# Patient Record
Sex: MALE | Race: WHITE | NOT HISPANIC OR LATINO | Employment: FULL TIME | URBAN - METROPOLITAN AREA
[De-identification: names, ages, dates, MRNs, and addresses within clinical notes are randomized per-mention and may not be internally consistent; named-entity substitution may affect disease eponyms.]

---

## 2017-01-06 ENCOUNTER — ALLSCRIPTS OFFICE VISIT (OUTPATIENT)
Dept: OTHER | Facility: OTHER | Age: 27
End: 2017-01-06

## 2017-01-06 DIAGNOSIS — Z30.2 ENCOUNTER FOR STERILIZATION: ICD-10-CM

## 2017-11-15 ENCOUNTER — TRANSCRIBE ORDERS (OUTPATIENT)
Dept: ADMINISTRATIVE | Facility: HOSPITAL | Age: 27
End: 2017-11-15

## 2017-11-15 ENCOUNTER — ALLSCRIPTS OFFICE VISIT (OUTPATIENT)
Dept: OTHER | Facility: OTHER | Age: 27
End: 2017-11-15

## 2017-11-15 DIAGNOSIS — H57.02: ICD-10-CM

## 2017-11-15 DIAGNOSIS — H53.9 VISUAL DISTURBANCE: ICD-10-CM

## 2017-11-15 DIAGNOSIS — H53.9 VISION DISORDER: Primary | ICD-10-CM

## 2017-11-15 DIAGNOSIS — H57.02 ANISOCORIA: ICD-10-CM

## 2017-11-15 DIAGNOSIS — G44.099 OTHER TRIGEMINAL AUTONOMIC CEPHALGIAS (TAC), NOT INTRACTABLE: ICD-10-CM

## 2017-11-16 NOTE — PROGRESS NOTES
Assessment    1  Change in vision (368 9) (H53 9)  2  Unequal pupils (379 41) (H57 02)    Plan  Change in vision, Unequal pupils    · * MRI BRAIN WO CONTRAST; Status:Need Information - FinancialAuthorization,Retrospective By Protocol Authorization; Requested for:15Nov2017;    · Follow-up PRN Evaluation and Treatment  Follow-up  Status: Complete  Done:15Nov2017 06:34PM    Discussion/Summary    Unequal pupils 6 months ago and now recently in last 2 days  He was noncompliant with seeing f-up neuro ophthalmologist since he was better but now represents with similar complaints  Check MRI brain r/o tumor for change in vision and unequal pupils  [address] [facility]  ER if worse or if any neuro complaints  There was not much change in pupil size comparing left and right eye today  They were both equal for the most part and round and reactive to light and accommodation  The patient was counseled regarding  The treatment plan was reviewed with the patient/guardian  The patient/guardian understands and agrees with the treatment plan      Chief Complaint  pupils are unequal started around two days ago      History of Present Illness  HPI: pupils are unequal started around two days ago  His wife noticed unequal pupils 2 days ago  He had similar complaints 6 months ago and saw his optometrist and was told to get eval  with neuroopthalmologist for f-up but did not since it got better until now  No other complaints and feels well today  He did have visual complaints when it first happened 6 months ago  Pt  denies headaches or neur deficits today  He denies medication or drug use  No sluring of speech or other complaints, no mental status changes  He would like an MRI brain  Review of Systems   Constitutional: no fever or chills, feels well, no tiredness, no recent weight loss or weight gain  ENT: as noted in HPI    Cardiovascular: no complaints of slow or fast heart rate, no chest pain, no palpitations, no leg claudication or lower extremity edema  Respiratory: no complaints of shortness of breath, no wheezing or cough, no dyspnea on exertion, no orthopnea or PND  Gastrointestinal: no complaints of abdominal pain, no constipation, no nausea or vomiting, no diarrhea or bloody stools  Genitourinary: no complaints of dysuria or incontinence, no hesitancy, no nocturia, no genital lesion, no inadequacy of penile erection  Musculoskeletal: no complaints of arthralgia, no myalgia, no joint swelling or stiffness, no limb pain or swelling  Integumentary: no complaints of skin rash or lesion, no itching or dry skin, no skin wounds  Neurological: no complaints of headache, no confusion, no numbness or tingling, no dizziness or fainting-- and-- as noted in HPI  Active Problems  1  Acute URI (465 9) (J06 9)  2  Common cold (460) (J00)  3  Encounter for vasectomy (V25 2) (Z30 2)  4  Pre-hypertension (796 2) (R03 0)  5  Skin rash (782 1) (R21)  6  Sore throat (462) (J02 9)  7  Urticaria (708 9) (L50 9)  8  Vasectomy evaluation (V25 09) (Z30 09)    Past Medical History  1  History of acute sinusitis (V12 69) (Z87 09)  2  History of upper respiratory infection (V12 09) (Z87 09)  3  History of No significant past medical history    Family History  Mother   1  Family history of hypertension (V17 49) (Z82 49)  Father   2  Family history of cardiac disorder (V17 49) (Z82 49)  3  Family history of hypertension (V17 49) (Z82 49)    Social History   · Being A Social Drinker   ·    · Never a smoker   · Never A Smoker   · Never A Smoker   · No alcohol use    Surgical History  1  Denied: History of Recent Surgery  2  History of Surgery Vas Deferens Vasectomy    Allergies  1   No Known Drug Allergies    Vitals   Recorded: [id number] 91:45UJ   Systolic 967   Diastolic 82   Height 6 ft    Weight 195 lb    BMI Calculated 26 45   BSA Calculated 2 11       Physical Exam   Constitutional  General appearance: No acute distress, well appearing and well nourished  Eyes  Conjunctiva and lids: No swelling, erythema, or discharge  Pupils and irises: Equal, round and reactive to light  -- Pupils are equal and round but possible difference in pupil size left islightly larger  EOMI b/l1   Ears, Nose, Mouth, and Throat  External inspection of ears and nose: Normal    Otoscopic examination: Tympanic membrance translucent with normal light reflex  Canals patent without erythema  Nasal mucosa, septum, and turbinates: Normal without edema or erythema  Oropharynx: Normal with no erythema, edema, exudate or lesions  Pulmonary  Respiratory effort: No increased work of breathing or signs of respiratory distress  Auscultation of lungs: Clear to auscultation, equal breath sounds bilaterally, no wheezes, no rales, no rhonci  Cardiovascular  Palpation of heart: Normal PMI, no thrills  Auscultation of heart: Normal rate and rhythm, normal S1 and S2, without murmurs  Examination of extremities for edema and/or varicosities: Normal    Carotid pulses: Normal    Abdomen  Abdomen: Non-tender, no masses  Liver and spleen: No hepatomegaly or splenomegaly  Lymphatic  Palpation of lymph nodes in neck: No lymphadenopathy  Musculoskeletal  Gait and station: Normal    Digits and nails: Normal without clubbing or cyanosis  Inspection/palpation of joints, bones, and muscles: Normal    Skin  Skin and subcutaneous tissue: Normal without rashes or lesions  Neurologic  Cranial nerves: Cranial nerves 2-12 intact  Reflexes: 2+ and symmetric  Sensation: No sensory loss     Psychiatric  Orientation to person, place and time: Normal    Mood and affect: Normal           1 Amended By: Prateek Ramos; Nov 15 2017 6:38 PM EST    Signatures   Electronically signed by : Srinath Hammonds DO; Nov 15 2017  6:38PM EST                       (Author)

## 2017-11-18 ENCOUNTER — HOSPITAL ENCOUNTER (EMERGENCY)
Facility: HOSPITAL | Age: 27
Discharge: HOME | End: 2017-11-18
Attending: EMERGENCY MEDICINE | Admitting: EMERGENCY MEDICINE
Payer: COMMERCIAL

## 2017-11-18 VITALS
DIASTOLIC BLOOD PRESSURE: 83 MMHG | TEMPERATURE: 97.7 F | RESPIRATION RATE: 16 BRPM | HEART RATE: 72 BPM | WEIGHT: 195 LBS | BODY MASS INDEX: 26.41 KG/M2 | SYSTOLIC BLOOD PRESSURE: 146 MMHG | OXYGEN SATURATION: 98 % | HEIGHT: 72 IN

## 2017-11-18 DIAGNOSIS — Z04.9 OBSERVATION AND EVALUATION FOR SUSPECTED CONDITIONS NOT FOUND: Primary | ICD-10-CM

## 2017-11-18 DIAGNOSIS — R03.0 ELEVATED BLOOD PRESSURE READING: ICD-10-CM

## 2017-11-18 PROCEDURE — 99283 EMERGENCY DEPT VISIT LOW MDM: CPT

## 2017-11-18 NOTE — ED PROVIDER NOTES
History  Chief Complaint   Patient presents with    Eye Problem     6 months ago lost peripheral vision at the gym, monday his wife noticed his right pupil was much larger than the left  MRI scheduled for next saturday and opthamology appointment scheduled monday  patients wife says it keeps happening and had him come to the ED  65-year-old male presents for evaluation of anisocoria  Patient states that approximately 6 months ago, he had an episode while he was in the gym where he felt his peripheral vision was diminished and developed a yellow [place] in his bottom right visual field  He returned home and was noted by his wife to have right pupil greater than left  This episode lasted approximately 2 hours before self resolving  He went to the emergency department, but at that point his symptoms were resolved  He was advised to see his optometrist who checked his acuity and occular pressures with normal findings  He was referred to neuro opthalmology, but as he was unable to obtain an appointment for 2 weeks, decided not to pursue further follow up  Patient had a episode where his right pupil was greater than his left again 5 days ago  This time, there were no visual field defects and was otherwise asymptomatic  He has since developed mild pressure behind the eyes bilaterally, right greater than left  He saw his PCP who referred him to ophthalmology and for MRI which is scheduled for next Saturday  His wife noticed yet again that his pupils were unequal this morning  He continues to have mild pressure behind the eyes, but is otherwise asymptomatic  History provided by:  Patient  Eye Problem   Location:  Right eye  Quality: unequal pupils    Severity:  Mild  Onset quality:  Sudden  Duration:  2 hours  Timing:  Constant  Progression:  Resolved  Chronicity:  Recurrent  Context: contact lenses    Relieved by:  None tried  Worsened by:  Nothing  Ineffective treatments:  None tried  Associated symptoms: no discharge, no headaches, no itching, no nausea, no photophobia, no redness, no vomiting and no weakness    Risk factors: no previous injury to eye        None       History reviewed  No pertinent past medical history  History reviewed  No pertinent surgical history  History reviewed  No pertinent family history  I have reviewed and agree with the history as documented  Social History   Substance Use Topics    Smoking status: Never Smoker    Smokeless tobacco: Never Used    Alcohol use No        Review of Systems   Constitutional: Negative for appetite change, diaphoresis, fatigue and fever  HENT: Negative for congestion, rhinorrhea and sore throat  Eyes: Negative for photophobia, pain, discharge, redness, itching and visual disturbance  Respiratory: Negative for cough, chest tightness and shortness of breath  Cardiovascular: Negative for chest pain, palpitations and leg swelling  Gastrointestinal: Negative for abdominal pain, constipation, diarrhea, nausea and vomiting  Genitourinary: Negative for difficulty urinating, dysuria, frequency and hematuria  Musculoskeletal: Negative for myalgias, neck pain and neck stiffness  Skin: Negative for pallor and rash  Neurological: Negative for syncope, weakness and headaches  All other systems reviewed and are negative  Physical Exam  ED Triage Vitals [11/18/17 1205]   Temperature Pulse Respirations Blood Pressure SpO2   97 7 °F (36 5 °C) 70 16 170/97 99 %      Temp Source Heart Rate Source Patient Position - Orthostatic VS BP Location FiO2 (%)   Oral Monitor Sitting Left arm --      Pain Score       No Pain           Orthostatic Vital Signs  Vitals:    11/18/17 1205 11/18/17 1355   BP: 170/97 146/83   Pulse: 70 72   Patient Position - Orthostatic VS: Sitting        Physical Exam   Constitutional: He appears well-developed and well-nourished  Non-toxic appearance  No distress  HENT:   Head: Normocephalic and atraumatic     Eyes: Conjunctivae and EOM are normal  Pupils are equal, round, and reactive to light  Fundoscopic exam:       The right eye shows no AV nicking, no exudate, no hemorrhage and no papilledema  The left eye shows no AV nicking, no exudate, no hemorrhage and no papilledema  Neck: Normal range of motion  No tracheal deviation present  No thyromegaly present  Cardiovascular: Normal rate, regular rhythm, normal heart sounds and intact distal pulses  Pulmonary/Chest: Effort normal and breath sounds normal    Abdominal: Soft  Bowel sounds are normal  He exhibits no distension  There is no tenderness  Lymphadenopathy:     He has no cervical adenopathy  Skin: Skin is warm and dry  He is not diaphoretic  Psychiatric: He has a normal mood and affect  His behavior is normal  Judgment and thought content normal    Nursing note and vitals reviewed  ED Medications  Medications - No data to display    Diagnostic Studies  Results Reviewed     None                 No orders to display         Procedures  Procedures      Phone Consults  ED Phone Contact    ED Course  ED Course                                MDM  Number of Diagnoses or Management Options  Elevated blood pressure reading: new and requires workup  Observation and evaluation for suspected conditions not found: new and requires workup  Diagnosis management comments: 28year old male presents for evaluation of anisocoria  Patient otherwise asymptomatic  Normal fundoscopic exam  20/20 vision bilaterally with contact correction  Patient advised to follow up with his ophthalmologist  Patient also found to have an elevated blood pressure reading during his stay  PCP follow up for re-evaluation      Patient Progress  Patient progress: stable    CritCare Time    Disposition  Final diagnoses:   Elevated blood pressure reading   Observation and evaluation for suspected conditions not found     Time reflects when diagnosis was documented in both MDM as applicable and the Disposition within this note     Time User Action Codes Description Comment    11/18/2017  1:39 PM Samara Patrick Add [R03 0] Elevated blood pressure reading     11/18/2017  1:40 PM Mohan BILLS Add [Z04 9] Observation and evaluation for suspected conditions not found     11/18/2017  1:40 PM Darnell, Samara Wade Modify [R03 0] Elevated blood pressure reading     11/18/2017  1:40 PM Darnell, Samara Wade Modify [Z04 9] Observation and evaluation for suspected conditions not found       ED Disposition     ED Disposition Condition Comment    Discharge  Landon Bassettheather discharge to home/self care  Condition at discharge: Stable        Follow-up Information     Follow up With Specialties Details Why Contact Info Additional Information    Sully Denny, DO Family Medicine  please follow up in regards to your scheduled MRI and for re-evaluation of your elevated blood pressure [address]   [phone]       your ophalmologist  Schedule an appointment as soon as possible for a visit in 1 week As needed for further evaluation      [address] [place] [address] Emergency Department Emergency Medicine Go to if you develop visual changes, numbness, weakness, or severe headache [facility] ED, [address], [facility]        Patient's Medications    No medications on file     No discharge procedures on file  ED Provider  Attending physically available and evaluated Landon Larkin I managed the patient along with the ED Attending      Electronically Signed by         Zayda Collins MD  Resident  11/18/17 2293

## 2017-11-18 NOTE — DISCHARGE INSTRUCTIONS
Hypertension   WHAT YOU NEED TO KNOW:   What is hypertension? Hypertension is high blood pressure (BP)  Your BP is the force of your blood moving against the walls of your arteries  Normal BP is less than 120/80  Prehypertension is between 120/80 and 139/89  Hypertension is 140/90 or higher  Hypertension causes your BP to get so high that your heart has to work much harder than normal  This can damage your heart  You can control hypertension with a healthy lifestyle or medicines  A controlled blood pressure helps protect your organs, such as your heart, lungs, brain, and kidneys  What causes hypertension? The cause of hypertension may not be known  This type of hypertension is called essential or primary hypertension  Hypertension can sometimes be caused by other medical conditions, such as kidney disease  This type of hypertension is called secondary hypertension  What increases my risk for hypertension? · Age older than 54 years (men)    · Age older than 72 years (women)    · A family history of hypertension or heart disease     · Obesity or lack of exercise     · Too many high-sodium foods    · Stress     · Use of tobacco, alcohol, or illegal drugs     · A medical condition, such as diabetes, kidney disease, thyroid disease, or adrenal gland disorder     · Certain medicines, such as steroids or birth control pills  What are the signs and symptoms of hypertension? You may have no signs or symptoms, or you may have any of the following:  · Headache     · Blurred vision     · Chest pain     · Dizziness or weakness     · Trouble breathing    · Nosebleeds  How is hypertension diagnosed? Your healthcare provider will ask about your symptoms and the medicines you take  He or she will also ask if you have a family history of high blood pressure and about any health conditions you have  He or she will also check your blood pressure and weight and examine your heart, lungs, and eyes   You may need any of the following tests:  · Blood tests  may help healthcare providers find the cause of your hypertension  Blood tests can also help find other health problems caused by hypertension  · Urine tests  will be done to check your kidney function  Kidney problems can increase your risk for hypertension  How is hypertension treated? Your healthcare provider will recommend lifestyle changes to lower your BP  You may also need the following medicines:  · Medicine  may be used to help lower your BP  You may need more than one type of medicine  Take the medicine exactly as directed  · Diuretics  help decrease extra fluid that collects in your body  This will help lower your BP  You may urinate more often while you take this medicine  · Cholesterol medicine  helps lower your cholesterol level  A low cholesterol level helps prevent heart disease and makes it easier to control your blood pressure  What can I do to manage hypertension? Talk with your healthcare provider about these and other ways to manage hypertension:  · Check your BP at home  Sit and rest for 5 minutes before you take your BP  Extend your arm and support it on a flat surface  Your arm should be at the same level as your heart  Follow the directions that came with your BP monitor  If possible, take at least 2 BP readings each time  Take your BP at least twice a day at the same times each day, such as morning and evening  Keep a record of your BP readings and bring it to your follow-up visits  Ask your healthcare provider what your BP should be  · Limit sodium (salt) as directed  Too much sodium can affect your fluid balance  Check labels to find low-sodium or no-salt-added foods  Some low-sodium foods use potassium salts for flavor  Too much potassium can also cause health problems  Your healthcare provider will tell you how much sodium and potassium are safe for you to have in a day   He or she may recommend that you limit sodium to 2,300 mg a day            · Follow the meal plan recommended by your healthcare provider  A dietitian or your provider can give you more information on low-sodium plans or the DASH (Dietary Approaches to Stop Hypertension) eating plan  The DASH plan is low in sodium, unhealthy fats, and total fat  It is high in potassium, calcium, and fiber  · Exercise to maintain a healthy weight  Exercise at least 30 minutes per day, on most days of the week  This will help decrease your blood pressure  Ask your healthcare provider about the best exercise plan for you  · Decrease stress  This may help lower your BP  Learn ways to relax, such as deep breathing or listening to music  · Limit alcohol  Women should limit alcohol to 1 drink a day  Men should limit alcohol to 2 drinks a day  A drink of alcohol is 12 ounces of beer, 5 ounces of wine, or 1½ ounces of liquor  · Do not smoke  Nicotine and other chemicals in cigarettes and cigars can increase your BP and also cause lung damage  Ask your healthcare provider for information if you currently smoke and need help to quit  E-cigarettes or smokeless tobacco still contain nicotine  Talk to your healthcare provider before you use these products  · Manage any other health conditions you have  Health conditions such as diabetes can increase your risk for hypertension  Follow your healthcare provider's instructions and take all your medicines as directed  Call 911 for any of the following:   · You have discomfort in your chest that feels like squeezing, pressure, fullness, or pain  · You become confused or have difficulty speaking  · You suddenly feel lightheaded or have trouble breathing  · You have pain or discomfort in your back, neck, jaw, stomach, or arm  When should I seek immediate care? · You have a severe headache or vision loss  · You have weakness in an arm or leg  When should I contact my healthcare provider?    · You feel faint, dizzy, confused, or drowsy  · You have been taking your BP medicine and your BP is still higher than your healthcare provider says it should be  · You have questions or concerns about your condition or care  CARE AGREEMENT:   You have the right to help plan your care  Learn about your health condition and how it may be treated  Discuss treatment options with your caregivers to decide what care you want to receive  You always have the right to refuse treatment  The above information is an  only  It is not intended as medical advice for individual conditions or treatments  Talk to your doctor, nurse or pharmacist before following any medical regimen to see if it is safe and effective for you  © 2017 [address] [facility] Information is for End User's use only and may not be sold, redistributed or otherwise used for commercial purposes  All illustrations and images included in CareNotes® are the copyrighted property of A D A M , Inc  or [employer]ov[employer] Lo[employer]

## 2017-11-19 NOTE — ED ATTENDING ATTESTATION
Darryl Garcia DO, saw and evaluated the patient  I have discussed the patient with the resident/non-physician practitioner and agree with the resident's/non-physician practitioner's findings, Plan of Care, and MDM as documented in the resident's/non-physician practitioner's note, except where noted  All available labs and Radiology studies were reviewed  At this point I agree with the current assessment done in the Emergency Department  I have conducted an independent evaluation of this patient a history and physical is as follows:  Patient is a 79-year-old male presenting for evaluation madi Willingham  Patient states approximately 6 months ago in episode in the gym refilled his peripheral vision was diminished after strenuous activity  He then stated he saw yellow cervical in the bottom right hand corner of his visual field  When he returned home his wife noted that his right pupil is greater than his left  He proceed emergency department that day but by the time is at the emergency department his condition and resolved  She was asymptomatic at that time  He did see his optometrist to checked his visual acuity knocking pressures and he reports that they were normal  He was referred to a neuro ophthalmologist but has not had that appointment as of yet  Symptoms have since resolved  His wife states she again noticed his right pupil is larger than his left  Did follow up with his primary care physician who referred him to Ophthalmology in for an MRI which is scheduled for next week  His wife noticed again this morning that his pupils were unequal and recommended he be evaluated emergency department  Patient is currently without symptoms  Patient is mildly hypertensive in 170s over 90s will repeat that  Currently patient is asymptomatic  We did review typically and score is completely benign condition  His he had normal visual acuity  No other changes in his vision at this time    Repeat blood pressure has improved  We did discuss that he had a conversation with his primary care physician regarding his high blood pressure  Patient understands  Continue outpatient evaluation for his and aniscoria      Critical Care Time  CritCare Time

## 2017-11-25 ENCOUNTER — HOSPITAL ENCOUNTER (OUTPATIENT)
Dept: RADIOLOGY | Facility: HOSPITAL | Age: 27
Discharge: HOME | End: 2017-11-25
Payer: COMMERCIAL

## 2017-11-25 DIAGNOSIS — H57.02 ANISOCORIA: ICD-10-CM

## 2017-11-25 DIAGNOSIS — H53.9 VISUAL DISTURBANCE: ICD-10-CM

## 2017-11-25 PROCEDURE — 70551 MRI BRAIN STEM W/O DYE: CPT

## 2017-11-27 ENCOUNTER — GENERIC CONVERSION - ENCOUNTER (OUTPATIENT)
Dept: OTHER | Facility: OTHER | Age: 27
End: 2017-11-27

## 2017-12-05 ENCOUNTER — TRANSCRIBE ORDERS (OUTPATIENT)
Dept: ADMINISTRATIVE | Facility: HOSPITAL | Age: 27
End: 2017-12-05

## 2017-12-05 ENCOUNTER — ALLSCRIPTS OFFICE VISIT (OUTPATIENT)
Dept: OTHER | Facility: OTHER | Age: 27
End: 2017-12-05

## 2017-12-05 DIAGNOSIS — G93.0 CEREBRAL CYSTS: ICD-10-CM

## 2017-12-05 DIAGNOSIS — H53.9 DISTURBANCES OF VISION, LATE EFFECT OF CEREBROVASCULAR DISEASE: Primary | ICD-10-CM

## 2017-12-05 DIAGNOSIS — I69.998 DISTURBANCES OF VISION, LATE EFFECT OF CEREBROVASCULAR DISEASE: Primary | ICD-10-CM

## 2017-12-05 DIAGNOSIS — H57.02 ANISOCORIA: ICD-10-CM

## 2017-12-05 DIAGNOSIS — G44.099 TACS (TRIGEMINAL AUTONOMIC CEPHALGIAS): ICD-10-CM

## 2017-12-06 NOTE — CONSULTS
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)    Plan  Change in vision    · (1) BUN; Status:Active; Requested for:16Bgj4110;    Perform:[facility]; Due:14Qxo5253; Ordered; For:Change in vision; Ordered By:Trisha Cain;   · (1) CREATININE; Status:Active; Requested for:58Bel4580;    Perform:[facility]; Due:95Joy9033; Ordered; For:Change in vision; Ordered By:Trisha Cain;  Change in vision, Episodic anisocoria, Subarachnoid cyst    · EEG AWAKE (ROUTINE); Status:Hold For - Scheduling; Requested for:98Zpj3905;    Perform:[facility]; RKT:85KSO6158; Ordered;in vision, Episodic anisocoria, Subarachnoid cyst; Ordered By:Trisha Cain;  Change in vision, Episodic anisocoria, Trigeminal autonomic cephalgias    · Follow-up visit in 3 months Evaluation and Treatment  Follow-up  Status: Hold For -Scheduling  Requested for: 34DSS8043   Ordered; Change in vision, Episodic anisocoria, Trigeminal autonomic cephalgias; Ordered By: Trisha Helms Performed:  Due: 53HXF6327  Episodic anisocoria, Trigeminal autonomic cephalgias, Unequal pupils    · CTA HEAD AND NECK W WO CONTRAST; Status:Need Information - FinancialAuthorization; Requested for:16Nxl8010;    Perform:[facility]; [phone]; Ordered;anisocoria, Trigeminal autonomic cephalgias, Unequal pupils; Ordered By:Trisha Cain;  Trigeminal autonomic cephalgias    · Indomethacin 25 MG Oral Capsule; TAKE 1 - 2 CAPSULEs 3 TIMES DAILY WITHFOOD AS NEEDED  PRN: onset of headache  Max 3 days/week   Rx By: Jeremiah Barba; Dispense: 30 Days ; #:15 Capsule; Refill: 1;Trigeminal autonomic cephalgias; DAVONTE = N; Faxed To: [facility]Y #[id number]  · Verapamil HCl - 40 MG Oral Tablet;  Take 1 tablet daily at night x 1 week then 1 tabletBID   Rx By: Jeremiah Barba; Dispense: 0 Days ; #:60 Tablet; Refill: 5;Trigeminal autonomic cephalgias; DAVONTE = N; Faxed To: [facility]/PHARMACY #[id number]   Discussion/Summary  Discussion Summary:   New onset trigeminal autonomic cephalgia most likely cause of episodic pupil dilation often associated with mild to moderate new onset headaches and increased lacrimation of the left eye (often dilated)  Neurologic exam non focal at this timeAbortive: IndocinPreventative: Verapamil- 40 daily, increase to 40 BID as tolerated- discussed s/e including constipation, hypotension  Another option is propranolol should he have trouble tolerating this medication- he does have pre-HTN and these medications would help with this as well  He does have a picture of him during one of these episodes- which shows clear marked pupil dilation on the leftMRI bran with no significant abnormalities- 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 Discussed notifying his PCP and following up with them in this regard, especially given family history  Follow up in three months time  Counseling Documentation With Imm: The patient was counseled regarding  Medication SE Review and Pt Understands Tx: Possible side effects of new medications were reviewed with the patient/guardian today  The treatment plan was reviewed with the patient/guardian  The patient/guardian understands and agrees with the treatment plan   Patient Guardian understands agrees:  The treatment plan was reviewed with the patient/guardian  The patient/guardian understands and agrees with the treatment plan   Headache [place]:  The patient was counseled regarding;   Discussed side effects of all medications prescribed today to the patient in detail  Patient education was completed today and we also discussed precautions for rebound headaches  --   When patient has a moderate to severe headache, they should seek rest, initiate relaxation and apply cold compresses to the head  Also recommended to the patient :  1  Maintain regular sleep schedule -- 2  Limit over the counter medications  (No more than 3 times a week)  -- 3  Maintain headache diary  -- 4  Limit caffeine to 1-2 cups a day or less  -- 6  Patient is to have regular frequent meals to prevent headache onset  Chief Complaint  Chief Complaint Free Text Note Form: Patient presents for a consultation for visual changes  History of Present Illness  HPI: Mr Ion Negro is a pleasant 27 yo male seen in consultation for vision changes starting six months ago  the first time he noted this was about six months ago when he was working out, initially had starry vision and went home, he started noting increasingly growing [place] in his visual field that persisted with him closing either eye  States his peripheral vision kept getting narrower  States lasted 2-3 hours  States no headache with this  States his wife noted his right pupil was larger than left pupil he almost went to the ER, and states this resolved by the time he got there thus did not  he saw opthalmology and no abnormalities per them  he had another event 3-4 weeks ago, where he got home and his wife noted transient right pupil dilation- states this was reactive per his wife  mild to moderate headache with this  increased tearing of his right eye in association  since 3-4 weeks ago has had twice weekly headaches, associated with headache and tearing- lasting 2-3 days   States he can work through these headaches- take Advil and goes about his day  headaches new- left sided frontal- dull achy, no photo or phonophobia, nausea or vomiting  No triggers  No change with position  States usually notes it in afternoon  Not worse or triggered by activity, cough, BM, sneezing, chewing or lying down for a prolonged period  No other associated neurologic deficits other than that noted above  States other than his first episode, has not had actual vision change- his wife just notes pupil abnormality and he notes increased tearing and headaches  Advil 400 or 600 mg taken 2-3 days a week can help at times  paternal grandfather and maternal grandmother with stroke- states does not know if they had an aneurysmfamily history of MS in brother has not gone back to exercise since this episode  HTN noted when he checks it occasionally states upto 140s SBP  no other neurologic deficits  States he feels great othrwise  stays hydrated, does not skip meals, no significant caffeine use, and has a healthy diet  no sleep deprivation  family history headaches, migraines, recurrent blood clots  Dad with CAD and HTN in 45s  Review of Systems  Neurological ROS:  Constitutional: no fever, no chills, no recent weight gain, no recent weight loss, no complaints of feeling tired, no changes in appetite  HEENT:  no sinus problems, not feeling congested, no blurred vision, no dryness of the eyes, no eye pain, no hearing loss, no tinnitus, no mouth sores, no sore throat, no hoarseness, no dysphagia, no masses, no bleeding  Respiratory:  no unusual or persistant cough, no shortness of breath with or without exertion  Gastrointestinal:  no nausea, no vomiting, no diarrhea, no abdominal pain, no changes in bowel habits, no melena, no loss of bowel control  Genitourinary:  no incontinence, no feelings of urinary urgency, no increase in frequency, no urinary hesitancy, no dysuria, no hematuria    Musculoskeletal:  no arthralgias, no myalgias, no immobility or loss of function, no head/neck/back pain, no pain while walking  Integumentary  no masses, no rash, no skin lesions, no livedo reticularis  Psychiatric:  no anxiety, no depression, no mood swings, no psychiatric hospitalizations, no sleep problems  Endocrine  no unusual weight loss or gain, no excessive urination, no excessive thirst, no hair loss or gain, no hot or cold intolerance, no menstrual period change or irregularity, no loss of sexual ability or drive, no erection difficulty, no nipple discharge  Hematologic/Lymphatic:  no unusual bleeding, no tendency for easy bruising, no clotting skin or lumps  Neurological General: headache  Neurological Cranial Nerves:  no blurry or double vision, no loss of vision, no face drooping, no facial numbness or weakness, no taste or smell loss/changes, no hearing loss or ringing, no vertigo or dizziness, no dysphagia, no slurred speech  Neurological Motor findings include:  no tremor, no twitching, no cramping(pre/post exercise), no atrophy  Neurological Coordination:  no unsteadiness, no vertigo or dizziness, no clumsiness, no problems reaching for objects  Neurological Sensory:  no numbness, no pain, no tingling, does not fall when eyes closed or taking a shower  Neurological Gait:  no difficulty walking, not falling to one side, no sensation of being pushed, has not had falls  ROS Reviewed:   ROS reviewed  Active Problems  1  Acute URI (465 9) (J06 9)   2  Change in vision (368 9) (H53 9)   3  Common cold (460) (J00)   4  Encounter for vasectomy (V25 2) (Z30 2)   5  Pre-hypertension (796 2) (R03 0)   6  Skin rash (782 1) (R21)   7  Sore throat (462) (J02 9)   8  Unequal pupils (379 41) (H57 02)   9  Urticaria (708 9) (L50 9)   10  Vasectomy evaluation (V25 09) (Z30 09)    Past Medical History  1  History of acute sinusitis (V12 69) (Z87 09)   2  History of upper respiratory infection (V12 09) (Z87 09)   3   History of No significant past medical history  Active Problems And Past Medical History Reviewed: The active problems and past medical history were reviewed and updated today  Surgical History  1  Denied: History of Recent Surgery   2  History of Surgery Vas Deferens Vasectomy    Family History  Mother    1  Family history of hypertension (V17 49) (Z82 49)  Father    2  Family history of cardiac disorder (V17 49) (Z82 49)   3  Family history of hypertension (V17 49) (Z82 49)  Family History Reviewed: The family history was reviewed and updated today  Social History   · Being A Social Drinker   ·    · Never a smoker   · Never A Smoker   · Never A Smoker   · No alcohol use  Social History Reviewed: The social history was reviewed and updated today  Allergies    1  No Known Drug Allergies    Vitals  Signs   Recorded: 13KJO7729 01:09PM   Heart Rate: 72  Respiration: 14  Systolic: 745  Diastolic: 80  Height: 6 ft   Weight: 195 lb   BMI Calculated: 26 45  BSA Calculated: 2 11  O2 Saturation: 96    Physical Exam   Constitutional  General Appearance: Appears appropriate for age, healthy, well developed, appropriately groomed and appropriately dressed   Eyes  Ophthalmoscopic examination: Vision is grossly normal  Gross visual field testing by confrontation shows no abnormalities  EOMI in both eyes  Conjunctivae clear  Eyelids normal palpebral fissures equal  Orbits exhibit normal position  No discharge from the eyes  PERRL  External inspection of ears and nose: Normal      Neck  Neck and thyroid: Normal to inspection and palpation  Pulmonary  Respiratory effort: Lungs are clear bilaterally  Cardiovascular  Auscultation of heart: Rate is regular  Rhythm is regular  Peripheral vascular exam: Normal pulses throughout, no tenderness, erythema or swelling  Musculoskeletal  Gait and Station: Walks with normal gait  Tandem walk test is normal  Romberg's test is negative  Muscle strength: Normal strength throughout     Muscle tone: No atrophy, abnormal movements, flaccidity, cogwheeling or spasticity  Range of motion: Normal     Stability: Normal     Inspection of temporomandibular joint appears normal    Neurologic  Orientation to person, place, and time: Normal    Attention span and concentration: Normal thought process and attention span  Language: Names objects, able to repeat phrases and speaks spontaneously  Fund of knowledge: Normal vocabulary with appropriate knowledge of current events and past history  Sensation: Intact sensation to pinprick, temperature, vibration, and proprioception in all four extremities  Reflexes: DTR's are normal and symmetric bilaterally  Babinski's reflex is negative bilaterally  No pathologic ankle clonus  Coordination: Cerebellum function intact  No involuntary movement or psychomotor activity  Motor System: No pronator drift  Upper Extremities: Normal to inspection  No tenderness over the upper extremities bilaterally  No instability bilaterally  Strength: Motor strength is 5/5 bilaterally  Normal muscle tone bilaterally  Muscle bulk: Muscle bulk is normal bilaterally  Full ROM bilaterally  Lower Extremities: Normal to inspection and palpation  No tenderness of the lower extremities bilaterally  Exhibits no instability bilaterally  Strength: Motor strength is 5/5 bilaterally  Normal muscle tone bilaterally  Muscle Bulk: Muscle bulk is normal bilaterally  Full ROM bilaterally  Cranial Nerve Exam  II: Normal with no deficit  III,IV, VI: Normal with no deficit  V: Normal with no deficit  VII: Normal with no deficit  VIII: Normal with no deficit  IX: Normal with no deficit  X: Normal with no deficit  XI: Normal with no deficit  XII: Normal with no deficit  Recent and remote memory: Intact      Mood and affect: Normal        Signatures   Electronically signed by : Gladis Mancuso MD; Dec  5 2017  2:13PM EST                       (Author)

## 2017-12-13 ENCOUNTER — APPOINTMENT (OUTPATIENT)
Dept: LAB | Facility: HOSPITAL | Age: 27
End: 2017-12-13
Attending: PSYCHIATRY & NEUROLOGY
Payer: COMMERCIAL

## 2017-12-13 DIAGNOSIS — H53.9 VISUAL DISTURBANCE: ICD-10-CM

## 2017-12-13 LAB
BUN SERPL-MCNC: 15 MG/DL (ref 5–25)
CREAT SERPL-MCNC: 0.93 MG/DL (ref 0.6–1.3)
GFR SERPL CREATININE-BSD FRML MDRD: 111 ML/MIN/1.73SQ M

## 2017-12-13 PROCEDURE — 84520 ASSAY OF UREA NITROGEN: CPT

## 2017-12-13 PROCEDURE — 82565 ASSAY OF CREATININE: CPT

## 2017-12-13 PROCEDURE — 36415 COLL VENOUS BLD VENIPUNCTURE: CPT

## 2017-12-20 ENCOUNTER — HOSPITAL ENCOUNTER (OUTPATIENT)
Dept: NEUROLOGY | Facility: HOSPITAL | Age: 27
Discharge: HOME | End: 2017-12-23
Attending: PSYCHIATRY & NEUROLOGY
Payer: COMMERCIAL

## 2017-12-20 ENCOUNTER — GENERIC CONVERSION - ENCOUNTER (OUTPATIENT)
Dept: OTHER | Facility: OTHER | Age: 27
End: 2017-12-20

## 2017-12-20 DIAGNOSIS — I69.998 DISTURBANCES OF VISION, LATE EFFECT OF CEREBROVASCULAR DISEASE: ICD-10-CM

## 2017-12-20 DIAGNOSIS — G93.0 CEREBRAL CYSTS: ICD-10-CM

## 2017-12-20 DIAGNOSIS — H53.9 DISTURBANCES OF VISION, LATE EFFECT OF CEREBROVASCULAR DISEASE: ICD-10-CM

## 2017-12-20 DIAGNOSIS — H57.02 ANISOCORIA: ICD-10-CM

## 2017-12-20 PROCEDURE — 95816 EEG AWAKE AND DROWSY: CPT

## 2017-12-20 NOTE — PROCEDURES
ELECTROENCEPHALOGRAM    Patient Name:  Gal Martínez  MRN: [id number]   :  1990 File #: Pastora Ma [id number]   Age: 28 y o  Encounter #: [id number]   Date performed: 2017 Referring Provider: Prince Pfeiffer MD          Report date: 2017          Study type: awake and drowsy EEG    ICD 10 diagnosis: Spells/Fit NOS R56 9 and Visual disturbance, unspecified H53 9    Patient History:  EEG is requested to assess for seizures and/or classification of epilepsy  Patient is 28 y o  male who has been having recurrent headaches associated with visual disturbances  Sometimes there is loss of peripheral vision, sometimes there is a large yellow (colorful) [place]  He has been having headaches over the left frontal region 1-2 times a week  Current AEDs: none  Medications include: verapamil, fish oil    Description of Procedure: The EEG was performed with electrodes applied using the International 10-20 System  Additional electrodes used included EOG, ECG and T1/T2 electrodes  A single lead ECG channel is also present  At least 16 channels are reviewed at a time and formatted into longitudinal bipolar, transverse bipolar, and referential (to common reference or calculated common reference) montages  The EEG was recorded with the patient awake and drowsy  The recording was technically satisfactory  EEG was recorded from 17:12 to 17:46  Findings:   Background Activity: The background is grossly symmetric with respect to voltages and activities  During wakefulness, the background is well-organized with anterior low amplitude beta activity and low-moderate amplitude posterior alpha activity  There is a symmetric 10-10 5 Hz posterior dominant rhythm that attenuates with eye opening        Drowsiness is characterized by attentuation of the alpha rhythm, prominent anterior beta activity, central theta activity, roving eye movements, vertex waves and positive occipital sharp transients of sleep (POSTS)  Activation Procedures:   Hyperventilation was performed with good effort up to 4 minutes and produced symmetric diffuse background slowing and recurrent high amplitude rhythmic 2 Hz delta activity  There was no unusual behavior  Patient reported feeling dizzy as if he was going to pass out  High amplitude rhythmic delta activity stopped when the patient stopped hyperventilating  Mild background slowing did not last longer than 1 minute after cessation of hyperventilation  Stepped photic stimulation from 1 to 30 fps was performed and produced symmetric photic driving response  Other findings: The single lead ECG shows a regular, sinus rhythm and sinus bradycardia  Interpretation: This is a normal 34 minutes awake and drowsy EEG  Hyperventilation induced high amplitude rhythmic delta activity is unusual for the patient's age group but is not considered a pathological finding  There is no focal slowing from hyperventilation  Given the association of hyperventilation induced vasoconstriction of the intracranial vessels, it is reasonable to consider evaluation of the patency of the intracranial vessels      Elda Tellez MD  [facility]  [address] [place] [address]

## 2017-12-26 ENCOUNTER — ALLSCRIPTS OFFICE VISIT (OUTPATIENT)
Dept: OTHER | Facility: OTHER | Age: 27
End: 2017-12-26

## 2017-12-27 NOTE — PROGRESS NOTES
Assessment   1  Acute URI (465 9) (J06 9)   2  Sore throat (462) (J02 9)    Plan   Acute URI, Sore throat    · Azithromycin 250 MG Oral Tablet; TAKE 2 TABLETS ON DAY 1 THEN TAKE 1    TABLET A DAY FOR 4 DAYS   · Follow-up PRN Evaluation and Treatment  Follow-up  Status: Complete  Done:    [id number]    Discussion/Summary      Rest and fluids, start abx and call if any problems  The patient was counseled regarding  Possible side effects of new medications were reviewed with the patient/guardian today  The treatment plan was reviewed with the patient/guardian  The patient/guardian understands and agrees with the treatment plan      Chief Complaint   Pt c/o sore throat x 2 days, chills, post nasal drip x 1 day  ksd,cma      History of Present Illness   HPI: Pt c/o sore throat x 2 days, chills, post nasal drip x 1 day  Here with daughter  Review of Systems        Constitutional: no fever or chills, feels well, no tiredness, no recent weight loss or weight gain  ENT: as noted in HPI  Cardiovascular: no complaints of slow or fast heart rate, no chest pain, no palpitations, no leg claudication or lower extremity edema  Respiratory: no complaints of shortness of breath, no wheezing or cough, no dyspnea on exertion, no orthopnea or PND  Gastrointestinal: no complaints of abdominal pain, no constipation, no nausea or vomiting, no diarrhea or bloody stools  Genitourinary: no complaints of dysuria or incontinence, no hesitancy, no nocturia, no genital lesion, no inadequacy of penile erection  Musculoskeletal: no complaints of arthralgia, no myalgia, no joint swelling or stiffness, no limb pain or swelling  Integumentary: no complaints of skin rash or lesion, no itching or dry skin, no skin wounds  Neurological: no complaints of headache, no confusion, no numbness or tingling, no dizziness or fainting  Active Problems   1  Acute URI (465 9) (J06 9)   2   Change in vision (368  9) (H53 9)   3  Common cold (460) (J00)   4  Encounter for vasectomy (V25 2) (Z30 2)   5  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)   6  Pre-hypertension (796 2) (R03 0)   7  Skin rash (782 1) (R21)   8  Sore throat (462) (J02 9)   9  Subarachnoid cyst (348 0) (G93 0)   10  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)   11  Unequal pupils (379 41) (H57 02)   12  Urticaria (708 9) (L50 9)   13  Vasectomy evaluation (V25 09) (Z30 09)    Past Medical History   1  History of acute sinusitis (V12 69) (Z87 09)   2  History of upper respiratory infection (V12 09) (Z87 09)   3  History of No significant past medical history    Family History   Mother    1  Family history of hypertension (V17 49) (Z82 49)  Father    2  Family history of cardiac disorder (V17 49) (Z82 49)   3  Family history of hypertension (V17 49) (Z82 49)    Social History    · Being A Social Drinker   ·    · Never a smoker   · Never A Smoker   · Never A Smoker   · No alcohol use    Surgical History   1  Denied: History of Recent Surgery   2  History of Surgery Vas Deferens Vasectomy    Current Meds    1  Indomethacin 25 MG Oral Capsule; TAKE 1 - 2 CAPSULEs 3 TIMES DAILY WITH FOOD     AS NEEDED  PRN: onset of headache  Max 3 days/week; Therapy: [id number] to (Evaluate:56Eix2143); Last Rx:[id number] Ordered   2  Verapamil HCl - 40 MG Oral Tablet; Take 1 tablet daily at night x 1 week then 1 tablet     BID; Therapy: 20SCQ4012 to (Last Rx:61Doq7562) Ordered    Allergies   1  No Known Drug Allergies    Vitals    Recorded: 63JCT9075 12:55PM   Temperature 56 4 F   Systolic 836   Diastolic 70   Height 6 ft    Weight 193 lb 2 oz   BMI Calculated 26 19   BSA Calculated 2 1     Physical Exam        Constitutional      General appearance: No acute distress, well appearing and well nourished  Eyes      Conjunctiva and lids: No swelling, erythema, or discharge  Pupils and irises: Equal, round and reactive to light         Ears, Nose, Mouth, and Throat      External inspection of ears and nose: Normal        Otoscopic examination: Tympanic membrance translucent with normal light reflex  Canals patent without erythema  Nasal mucosa, septum, and turbinates: Normal without edema or erythema  Oropharynx: Abnormal  -- red throat  Pulmonary      Respiratory effort: No increased work of breathing or signs of respiratory distress  Auscultation of lungs: Clear to auscultation, equal breath sounds bilaterally, no wheezes, no rales, no rhonci  Cardiovascular      Palpation of heart: Normal PMI, no thrills  Auscultation of heart: Normal rate and rhythm, normal S1 and S2, without murmurs  Examination of extremities for edema and/or varicosities: Normal        Carotid pulses: Normal        Abdomen      Abdomen: Non-tender, no masses  Liver and spleen: No hepatomegaly or splenomegaly  Lymphatic      Palpation of lymph nodes in neck: No lymphadenopathy  Musculoskeletal      Gait and station: Normal        Digits and nails: Normal without clubbing or cyanosis  Inspection/palpation of joints, bones, and muscles: Normal        Skin      Skin and subcutaneous tissue: Normal without rashes or lesions  Neurologic      Cranial nerves: Cranial nerves 2-12 intact  Reflexes: 2+ and symmetric  Sensation: No sensory loss  Psychiatric      Orientation to person, place and time: Normal        Mood and affect: Normal           Results/Data   PHQ-2 Adult Depression Screening 11Tys7400 12:57PM User, [facility]      Test Name Result Flag Reference   PHQ-2 Adult Depression Score 0     Over the last two weeks, how often have you been bothered by any of the following problems?      Little interest or pleasure in doing things: Not at all - 0     Feeling down, depressed, or hopeless: Not at all - 0   PHQ-2 Adult Depression Screening Negative          Future Appointments      Date/Time Provider Specialty Site   02/07/2018 03:00 PM Yoselyn Benjamin MD Neurology  [id number]     Signatures    Electronically signed by : Will Tyson DO; Dec 26 2017  1:27PM EST                       (Author)

## 2017-12-30 ENCOUNTER — HOSPITAL ENCOUNTER (OUTPATIENT)
Dept: CT IMAGING | Facility: HOSPITAL | Age: 27
Discharge: HOME | End: 2017-12-30
Attending: PSYCHIATRY & NEUROLOGY
Payer: COMMERCIAL

## 2017-12-30 DIAGNOSIS — H57.02 ANISOCORIA: ICD-10-CM

## 2017-12-30 DIAGNOSIS — G44.099 OTHER TRIGEMINAL AUTONOMIC CEPHALGIAS (TAC), NOT INTRACTABLE: ICD-10-CM

## 2017-12-30 PROCEDURE — 70496 CT ANGIOGRAPHY HEAD: CPT

## 2017-12-30 PROCEDURE — 70498 CT ANGIOGRAPHY NECK: CPT

## 2017-12-30 RX ADMIN — IOHEXOL 85 ML: 350 INJECTION, SOLUTION INTRAVENOUS at 08:06

## 2018-01-12 VITALS
SYSTOLIC BLOOD PRESSURE: 140 MMHG | BODY MASS INDEX: 27.41 KG/M2 | DIASTOLIC BLOOD PRESSURE: 100 MMHG | HEIGHT: 72 IN | WEIGHT: 202.38 LBS | HEART RATE: 60 BPM

## 2018-01-12 NOTE — RESULT NOTES
Verified Results  * MRI BRAIN WO CONTRAST [id number] 12:58PM Yesica Kaur Order Number: [id number]    - Patient Instructions: To schedule this appointment, please contact Central Scheduling at [phone]  Test Name Result Flag Reference   MRI BRAIN WO CONTRAST (Report)     MRI BRAIN WITHOUT CONTRAST     INDICATION: Unequal pupils  Visual disturbance for 6 months  COMPARISON:  None  TECHNIQUE: Sagittal T1, axial T2, axial FLAIR, axial T1, axial Creighton and axial diffusion imaging  IMAGE QUALITY: Diagnostic  FINDINGS:     BRAIN PARENCHYMA: Small arachnoid cyst peripheral to the left cerebellar hemisphere incidentally noted  Slight mass effect upon the hemisphere  Normal cerebral hemispheres  Unremarkable basal ganglia and basilar cisterns  Normal signal within the brainstem and cerebellum  Diffusion imaging is unremarkable  No acute or chronic hemorrhage  Normal corpus callosum and hypothalamus  VENTRICLES: The ventricles are normal in size and contour  SELLA AND PITUITARY GLAND: Normal      ORBITS: Normal      PARANASAL SINUSES: Normal      VASCULATURE: Evaluation of the major intracranial vasculature demonstrates appropriate flow voids  CALVARIUM AND SKULL BASE: Normal      EXTRACRANIAL SOFT TISSUES: Normal        IMPRESSION:     No acute intracranial pathology  Incidentally noted small arachnoid cyst peripheral to the left cerebellar hemisphere         Workstation performed: [id number]     Signed by:   Morteza Dunn DO   11/27/17

## 2018-01-13 VITALS
SYSTOLIC BLOOD PRESSURE: 122 MMHG | WEIGHT: 195 LBS | HEIGHT: 72 IN | BODY MASS INDEX: 26.41 KG/M2 | DIASTOLIC BLOOD PRESSURE: 82 MMHG

## 2018-01-23 VITALS
BODY MASS INDEX: 26.41 KG/M2 | RESPIRATION RATE: 14 BRPM | HEIGHT: 72 IN | DIASTOLIC BLOOD PRESSURE: 80 MMHG | HEART RATE: 72 BPM | OXYGEN SATURATION: 96 % | SYSTOLIC BLOOD PRESSURE: 124 MMHG | WEIGHT: 195 LBS

## 2018-01-23 VITALS
BODY MASS INDEX: 26.16 KG/M2 | WEIGHT: 193.13 LBS | HEIGHT: 72 IN | SYSTOLIC BLOOD PRESSURE: 124 MMHG | TEMPERATURE: 98.1 F | DIASTOLIC BLOOD PRESSURE: 70 MMHG

## 2018-01-23 NOTE — PROCEDURES
Procedures by Johan Diaz MD at  2017  6:05 PM      Author:  Johan Diaz MD Service:  Neurology Author Type:  Physician    Filed:  2017  6:24 PM Date of Service:  2017  6:05 PM Status:  Signed    :  Johan Diaz MD (Physician)        Procedure Orders:       1  EEG Routine and awake [[id number]] ordered by  at 17 1408                  ELECTROENCEPHALOGRAM    Patient Name:  Wilmar Gold  MRN: [id number]   :  1990 File #: Quynh Gamboa    Age: 28 y o  Encounter #: [id number]   Date performed: 2017 Referring Provider: Eusebia Patricio MD          Report date: 2017          Study type: awake and drowsy EEG    ICD 10 diagnosis: Spells/Fit NOS R56 9 and Visual disturbance, unspecified H53 9    Patient History:  EEG is requested to assess for seizures and/or classification of epilepsy  Patient is 28 y o  male who has been having recurrent headaches associated with visual disturbances  Sometimes there is loss of peripheral vision, sometimes there is a large yellow (colorful) [place]  He has been having headaches over the left frontal region 1-2 times a week  Current AEDs: none  Medications include: verapamil, fish oil    Description of Procedure: The EEG was performed with electrodes applied using the International 10-20 System  Additional electrodes used included EOG, ECG and T1/T2 electrodes  A single lead ECG channel is also  present  At least 16 channels are reviewed at a time  and formatted into longitudinal bipolar, transverse bipolar, and referential (to common reference or calculated common reference) montages  The EEG was recorded  with the patient awake and drowsy  The recording was technically satisfactory  EEG was recorded from 17:12 to 17:46  Findings:   Background Activity: The background is grossly symmetric with respect to voltages and activities    During wakefulness, the background is well-organized with anterior low amplitude beta activity and low-moderate amplitude  posterior alpha activity  There is a symmetric 10-10 5 Hz posterior dominant rhythm that attenuates with eye opening  Drowsiness is characterized by attentuation of the alpha rhythm, prominent anterior beta activity, central theta activity, roving eye movements, vertex waves and positive occipital sharp transients of sleep  (POSTS)  Activation Procedures:   Hyperventilation was performed with good effort up to 4 minutes and produced symmetric diffuse  background slowing and recurrent high amplitude rhythmic 2 Hz delta activity  There was no unusual behavior  Patient reported feeling dizzy as if he was going to pass out  High amplitude rhythmic delta activity stopped when the patient stopped hyperventilating  Mild background slowing did not last longer than 1 minute after cessation of hyperventilation  Stepped photic stimulation from 1 to 30 fps was performed and produced symmetric photic driving response  Other findings: The single lead ECG shows a regular, sinus rhythm and sinus bradycardia  Interpretation: This is a normal 34 minutes awake and drowsy  EEG  Hyperventilation induced high amplitude rhythmic delta activity is unusual for the patient's age group but is not considered a pathological finding  There is no focal slowing from hyperventilation  Given the association of hyperventilation induced vasoconstriction of the intracranial vessels, it is reasonable to consider evaluation of the patency of the intracranial vessels  MD [facility]  Nghia PRAJAPATI    Dec 20 2017  6:24PM [facility] Standard Time

## 2018-03-14 ENCOUNTER — ANESTHESIA EVENT (OUTPATIENT)
Dept: PERIOP | Facility: HOSPITAL | Age: 28
End: 2018-03-14
Payer: COMMERCIAL

## 2018-03-15 RX ORDER — VERAPAMIL HYDROCHLORIDE 40 MG/1
40 TABLET ORAL 2 TIMES DAILY
COMMUNITY
End: 2018-07-16 | Stop reason: DRUGHIGH

## 2018-03-15 NOTE — PRE-PROCEDURE INSTRUCTIONS
Pre-Surgery Instructions:   Medication Instructions    verapamil (CALAN) 40 mg tablet Instructed patient per Anesthesia Guidelines      Pre op and showering instructions reviewed with his wife-Pt has hibiclens

## 2018-03-22 ENCOUNTER — HOSPITAL ENCOUNTER (OUTPATIENT)
Facility: HOSPITAL | Age: 28
Setting detail: OUTPATIENT SURGERY
Discharge: HOME | End: 2018-03-22
Attending: SURGERY | Admitting: SURGERY
Payer: COMMERCIAL

## 2018-03-22 ENCOUNTER — ANESTHESIA (OUTPATIENT)
Dept: PERIOP | Facility: HOSPITAL | Age: 28
End: 2018-03-22
Payer: COMMERCIAL

## 2018-03-22 VITALS
BODY MASS INDEX: 24.38 KG/M2 | HEART RATE: 67 BPM | SYSTOLIC BLOOD PRESSURE: 138 MMHG | TEMPERATURE: 96.8 F | DIASTOLIC BLOOD PRESSURE: 78 MMHG | RESPIRATION RATE: 18 BRPM | WEIGHT: 180 LBS | OXYGEN SATURATION: 100 % | HEIGHT: 72 IN

## 2018-03-22 PROCEDURE — C1781 MESH (IMPLANTABLE): HCPCS | Performed by: SURGERY

## 2018-03-22 DEVICE — MODIFIED ONFLEX MESH
Type: IMPLANTABLE DEVICE | Site: INGUINAL | Status: FUNCTIONAL
Brand: MODIFIED ONFLEX MESH

## 2018-03-22 RX ORDER — MIDAZOLAM HYDROCHLORIDE 1 MG/ML
INJECTION INTRAMUSCULAR; INTRAVENOUS AS NEEDED
Status: DISCONTINUED | OUTPATIENT
Start: 2018-03-22 | End: 2018-03-22 | Stop reason: SURG

## 2018-03-22 RX ORDER — METOCLOPRAMIDE HYDROCHLORIDE 5 MG/ML
10 INJECTION INTRAMUSCULAR; INTRAVENOUS ONCE AS NEEDED
Status: DISCONTINUED | OUTPATIENT
Start: 2018-03-22 | End: 2018-03-22 | Stop reason: HOSPADM

## 2018-03-22 RX ORDER — FENTANYL CITRATE/PF 50 MCG/ML
50 SYRINGE (ML) INJECTION
Status: DISCONTINUED | OUTPATIENT
Start: 2018-03-22 | End: 2018-03-22 | Stop reason: HOSPADM

## 2018-03-22 RX ORDER — FENTANYL CITRATE 50 UG/ML
INJECTION, SOLUTION INTRAMUSCULAR; INTRAVENOUS AS NEEDED
Status: DISCONTINUED | OUTPATIENT
Start: 2018-03-22 | End: 2018-03-22 | Stop reason: SURG

## 2018-03-22 RX ORDER — ONDANSETRON 2 MG/ML
4 INJECTION INTRAMUSCULAR; INTRAVENOUS EVERY 4 HOURS PRN
Status: DISCONTINUED | OUTPATIENT
Start: 2018-03-22 | End: 2018-03-22 | Stop reason: HOSPADM

## 2018-03-22 RX ORDER — ONDANSETRON 2 MG/ML
INJECTION INTRAMUSCULAR; INTRAVENOUS AS NEEDED
Status: DISCONTINUED | OUTPATIENT
Start: 2018-03-22 | End: 2018-03-22 | Stop reason: SURG

## 2018-03-22 RX ORDER — SODIUM CHLORIDE 9 MG/ML
INJECTION, SOLUTION INTRAVENOUS CONTINUOUS PRN
Status: DISCONTINUED | OUTPATIENT
Start: 2018-03-22 | End: 2018-03-22 | Stop reason: SURG

## 2018-03-22 RX ORDER — MAGNESIUM HYDROXIDE 1200 MG/15ML
LIQUID ORAL AS NEEDED
Status: DISCONTINUED | OUTPATIENT
Start: 2018-03-22 | End: 2018-03-22 | Stop reason: HOSPADM

## 2018-03-22 RX ORDER — ONDANSETRON 2 MG/ML
4 INJECTION INTRAMUSCULAR; INTRAVENOUS ONCE AS NEEDED
Status: DISCONTINUED | OUTPATIENT
Start: 2018-03-22 | End: 2018-03-22 | Stop reason: HOSPADM

## 2018-03-22 RX ORDER — OXYCODONE HYDROCHLORIDE AND ACETAMINOPHEN 5; 325 MG/1; MG/1
1 TABLET ORAL EVERY 4 HOURS PRN
Status: DISCONTINUED | OUTPATIENT
Start: 2018-03-22 | End: 2018-03-22 | Stop reason: HOSPADM

## 2018-03-22 RX ORDER — BUPIVACAINE HYDROCHLORIDE 2.5 MG/ML
INJECTION, SOLUTION INFILTRATION; PERINEURAL AS NEEDED
Status: DISCONTINUED | OUTPATIENT
Start: 2018-03-22 | End: 2018-03-22 | Stop reason: HOSPADM

## 2018-03-22 RX ORDER — PROPOFOL 10 MG/ML
INJECTION, EMULSION INTRAVENOUS AS NEEDED
Status: DISCONTINUED | OUTPATIENT
Start: 2018-03-22 | End: 2018-03-22 | Stop reason: SURG

## 2018-03-22 RX ORDER — PROPOFOL 10 MG/ML
INJECTION, EMULSION INTRAVENOUS CONTINUOUS PRN
Status: DISCONTINUED | OUTPATIENT
Start: 2018-03-22 | End: 2018-03-22 | Stop reason: SURG

## 2018-03-22 RX ADMIN — MIDAZOLAM HYDROCHLORIDE 2 MG: 1 INJECTION, SOLUTION INTRAMUSCULAR; INTRAVENOUS at 09:25

## 2018-03-22 RX ADMIN — FENTANYL CITRATE 25 MCG: 50 INJECTION INTRAMUSCULAR; INTRAVENOUS at 10:22

## 2018-03-22 RX ADMIN — FENTANYL CITRATE 25 MCG: 50 INJECTION INTRAMUSCULAR; INTRAVENOUS at 09:30

## 2018-03-22 RX ADMIN — PROPOFOL 200 MG: 10 INJECTION, EMULSION INTRAVENOUS at 09:30

## 2018-03-22 RX ADMIN — FENTANYL CITRATE 25 MCG: 50 INJECTION INTRAMUSCULAR; INTRAVENOUS at 09:31

## 2018-03-22 RX ADMIN — DEXAMETHASONE SODIUM PHOSPHATE 4 MG: 10 INJECTION INTRAMUSCULAR; INTRAVENOUS at 09:34

## 2018-03-22 RX ADMIN — ONDANSETRON 4 MG: 2 INJECTION INTRAMUSCULAR; INTRAVENOUS at 09:34

## 2018-03-22 RX ADMIN — LIDOCAINE HYDROCHLORIDE 100 MG: 20 INJECTION, SOLUTION INTRAVENOUS at 09:30

## 2018-03-22 RX ADMIN — SODIUM CHLORIDE: 0.9 INJECTION, SOLUTION INTRAVENOUS at 09:27

## 2018-03-22 RX ADMIN — FENTANYL CITRATE 25 MCG: 50 INJECTION INTRAMUSCULAR; INTRAVENOUS at 10:08

## 2018-03-22 RX ADMIN — CEFAZOLIN SODIUM 2000 MG: 1 SOLUTION INTRAVENOUS at 09:27

## 2018-03-22 RX ADMIN — OXYCODONE HYDROCHLORIDE AND ACETAMINOPHEN 1 TABLET: 5; 325 TABLET ORAL at 11:21

## 2018-03-22 RX ADMIN — PROPOFOL 120 MCG/KG/MIN: 10 INJECTION, EMULSION INTRAVENOUS at 09:33

## 2018-03-22 NOTE — DISCHARGE INSTRUCTIONS
DIET AND ACTIVITY AS TOLERATED  MAY SHOWER  PAIN MEDICATION AS NEEDED  PLEASE USE MILK A MAGNESIA DAILY IN ORDER TO HELP PREVENT CONSTIPATION  PLEASE CALL [phone] FOR A FOLLOW-UP OFFICE VISIT FOR 2 WEEKS  MAY DRIVE WHEN NOT TAKING PAIN MEDICATION

## 2018-03-22 NOTE — ANESTHESIA PREPROCEDURE EVALUATION
Review of Systems/Medical History  Patient summary reviewed  Chart reviewed  No history of anesthetic complications     Cardiovascular  Negative cardio ROS    Pulmonary  Negative pulmonary ROS        GI/Hepatic            Endo/Other     GYN       Hematology   Musculoskeletal       Neurology    Headaches (Ocular side effects: on verapamil),    Psychology           Physical Exam    Airway    Mallampati score: II  TM Distance: >3 FB  Neck ROM: full     Dental       Cardiovascular  Comment: Negative ROS,     Pulmonary      Other Findings        Anesthesia Plan  ASA Score- 2     Anesthesia Type- general with ASA Monitors  Additional Monitors:   Airway Plan: LMA  Plan Factors-    Induction- intravenous  Postoperative Plan-     Informed Consent- Anesthetic plan and risks discussed with patient  I personally reviewed this patient with the CRNA  Discussed and agreed on the Anesthesia Plan with the CRNA  Maggy Hyde

## 2018-03-22 NOTE — OP NOTE
OPERATIVE REPORT  PATIENT NAME: Bakari Hawthorne    :  1990  MRN: [id number]  Pt Location: [facility]    SURGERY DATE: 3/22/2018    Surgeon(s) and Role:     Francisco Javier Collins MD - Primary    Preop Diagnosis:  Inguinal hernia [K40 90]    Post-Op Diagnosis Codes:     * Inguinal hernia [K40 90]    Procedure(s) (LRB):  INGUINAL HERNIA REPAIR (Right)    Specimen(s):  * No specimens in log *    Estimated Blood Loss:   Minimal    Drains:       Anesthesia Type:   General    Operative Indications:  Inguinal hernia [K40 90]      Operative Findings:  INDEPENDENT, NONSMOKER, ASA 2, WOUND CLASS 1, BMI 24, WEIGHT 180, HEIGHT 72 INCHES    Cardiovascular  Negative cardio ROS  Pulmonary  Negative pulmonary ROS       GI/Hepatic          Endo/Other    GYN      Hematology Musculoskeletal      Neurology  Headaches (Ocular side effects: on verapamil),  Psychology         Complications:   None    Procedure and Technique:  PATIENT WAS IDENTIFIED VISUALLY AND VIA ARMBAND  PLACED IN SUPINE POSITION  AFTER GENERAL ANESTHESIA THE ABDOMEN WAS PREPPED AND DRAPED IN A STERILE FASHION  0 25% MARCAINE WITH EPINEPHRINE WAS UTILIZED  INCISION WAS MADE THE RIGHT INGUINAL REGION  THIS CARRIED DOWN TO SKIN SUBCUTANEOUS TISSUE  SUPERFICIAL EPIGASTRIC VEIN WAS CLAMPED DIVIDED AND LIGATED  EXTERNAL OBLIQUE FIBERS WERE DIVIDED  A SELF-RETAINING RETRACTOR WAS THEN PLACED  CORD STRUCTURES WERE ENCIRCLED IN A PENROSE DRAIN AND PRESERVED  IF THERE IS NO TRANSVERSALIS FLOOR  THE DIRECT INGUINAL HERNIA SITE WAS OPENED  A PREPERITONEAL PLANE WAS THEN CREATED  A 3 INCH X 5 INCH PREPERITONEAL MESH WAS THEN INSERTED  THIS WAS SEWN IN PLACE WITH INTERRUPTED FIGURE-OF-EIGHT 1  VICRYL SUTURE  THIS CONNECTED THE TRANSVERSALIS FLOOR TO THE SHELVING EDGE OF THE INGUINAL LIGAMENT  ONLAY MESH WAS THEN ALSO SECURED  THE AREA WAS IRRIGATED  HIS ASPIRATED DRY  HEMOSTASIS WAS ASSURED    THE WOUND WAS THEN CLOSED WITH A RUNNING 3 0 PDS FOR EXTERNAL OBLIQUE FOLLOWED BY 3 0 VICRYL SUBCUTANEOUS AND 4 MONOCRYL SUTURES  DERMABOND WAS APPLIED  THE PATIENT TOLERATED THE PROCEDURE WELL  SPONGE AND COUNT CORRECT     I was present for the entire procedure    Patient Disposition:  PACU     SIGNATURE: Sera Prescott MD  DATE: March 22, 2018  TIME: 10:39 AM

## 2018-03-22 NOTE — ANESTHESIA POSTPROCEDURE EVALUATION
Post-Op Assessment Note      CV Status:  Stable    Mental Status:  Alert and awake    Hydration Status:  Euvolemic    PONV Controlled:  Controlled    Airway Patency:  Patent    Post Op Vitals Reviewed: Yes          Staff: CRNA, Anesthesiologist           BP   115/57   Temp  97 8   Pulse  53   Resp 12   SpO2   100

## 2018-05-03 ENCOUNTER — APPOINTMENT (OUTPATIENT)
Dept: RADIOLOGY | Facility: CLINIC | Age: 28
End: 2018-05-03
Payer: COMMERCIAL

## 2018-05-03 ENCOUNTER — OFFICE VISIT (OUTPATIENT)
Dept: OBGYN CLINIC | Facility: MEDICAL CENTER | Age: 28
End: 2018-05-03
Payer: COMMERCIAL

## 2018-05-03 VITALS
DIASTOLIC BLOOD PRESSURE: 79 MMHG | SYSTOLIC BLOOD PRESSURE: 133 MMHG | BODY MASS INDEX: 25.27 KG/M2 | HEART RATE: 97 BPM | HEIGHT: 72 IN | WEIGHT: 186.6 LBS

## 2018-05-03 DIAGNOSIS — M25.551 PAIN IN RIGHT HIP: ICD-10-CM

## 2018-05-03 DIAGNOSIS — M54.5 LOW BACK PAIN, UNSPECIFIED BACK PAIN LATERALITY, UNSPECIFIED CHRONICITY, WITH SCIATICA PRESENCE UNSPECIFIED: ICD-10-CM

## 2018-05-03 DIAGNOSIS — M25.552 PAIN IN LEFT HIP: ICD-10-CM

## 2018-05-03 DIAGNOSIS — G57.01 PIRIFORMIS SYNDROME OF RIGHT SIDE: Primary | ICD-10-CM

## 2018-05-03 PROCEDURE — 73522 X-RAY EXAM HIPS BI 3-4 VIEWS: CPT

## 2018-05-03 PROCEDURE — 99204 OFFICE O/P NEW MOD 45 MIN: CPT | Performed by: FAMILY MEDICINE

## 2018-05-03 PROCEDURE — 72100 X-RAY EXAM L-S SPINE 2/3 VWS: CPT

## 2018-05-03 NOTE — PROGRESS NOTES
1  Piriformis syndrome of right side  [facility]   2  Pain in left hip  XR hip/pelv 2-3 vws left if performed   3  Pain in right hip  CANCELED: XR hip/pelv 2-3 vws right if performed   4  Low back pain, unspecified back pain laterality, unspecified chronicity, with sciatica presence unspecified  XR spine lumbar 2 or 3 views injury     Physical Therapy     Orders Placed This Encounter   Procedures    XR hip/pelv 2-3 vws left if performed    XR spine lumbar 2 or 3 views injury    [facility]        Imaging Studies (I personally reviewed results in PACS):  X-ray AP pelvis bilateral hips 05/03/2018:  No acute osseous abnormality  X-ray lumbar spine 05/03/2018:  No acute osseous abnormality mild osteoarthritis L5-S1    IMPRESSION:    Piriformis syndrome right side  Greater trochanteric process syndrome      Return in about 6 weeks (around 6/14/2018)  Patient Instructions   Educated risks of mixing NSAIDS (also known as non-steroidal anti-inflammatory pills) including advil, ibuprofen, motrin, aleve, naproxen, aspirin, and ibuprofen containing products with each other or with steroids (such as prednisone, medrol)  Explained risks of mixing these medications including stomach ulcer, severe internal bleeding, and kidney failure  Instructed not to take NSAIDS if have history of stomach ulcers, kidney issues, or hypertension  Instructed patient to use only one brand as prescribed  For naproxen, a maximum of 500 mg per dose every 12 hours and no more than two doses or 1,000mg per day  For Ibuprofen, a maximum of 800 mg per dose every 6 hours but no more than 3 doses or 2,400 mg per day  Never take these medications together  Never take these medications the same day  For severe pain and only if you have no liver problems, you may add Tylenol (also known as acetaminophen) maximum of 1,000  Mg per dose every 6 hours but no more 3 doses or 3,000 mg per day   Patient expressed understanding and agreed to plan               CHIEF COMPLAINT:  Right hip pain    HPI:  Herbie Aguilar is a 28 y o  male  who presents for  Evaluation of right hip pain is an ongoing for approximately 1-2 years  Patient denies any injury or specific event related to his pain  Patient's past MA significant for right inguinal hernia repair 2018 without any change in his symptoms  He states he never any pain associated with this hernia however he had resolution of cosmetically bulge with surgery approximately 1 month ago  He states that his suture sites fully healed without any issue  Visit 05/03/2018:   Patient points to right lateral aspect of his hip as source of pain  Describes the pain as dull achy intermittent worse with jumping to lie on his side at nighttime  He points to trochanteric bursa region as well as his posterior hip rotators as source of pain  He denies any groin pain  Denies any numbness and tingling of his lower extremities  Denies any fevers chills saddle anesthesia bowel or bladder incontinence  Review of Systems   Constitutional: Negative for chills and fever  HENT: Negative for hearing loss  Eyes: Negative for visual disturbance  Respiratory: Negative for shortness of breath  Cardiovascular: Negative for chest pain  Gastrointestinal: Negative for abdominal pain  Genitourinary: Negative for difficulty urinating and dysuria  Neurological: Negative for weakness and numbness  Psychiatric/Behavioral: Negative for suicidal ideas           Following history reviewed and update:    Past Medical History:   Diagnosis Date    Hypertension     Migraines      Past Surgical History:   Procedure Laterality Date    COLONOSCOPY      UT REPAIR ING HERNIA,5+Y/O,REDUCIBL Right 3/22/2018    Procedure: INGUINAL HERNIA REPAIR;  Surgeon: Piyush Rivera MD;  Location: [facility];  Service: General     Social History   History   Alcohol Use No     History   Drug Use No     History   Smoking Status    Never Smoker   Smokeless Tobacco    Never Used     Family History   Problem Relation Age of Onset    Hypertension Mother     Coronary artery disease Father     Hypertension Father      Allergies   Allergen Reactions    Iv Dye [Iodinated Diagnostic Agents] Hives          Physical Exam  Constitutional: oriented to person, place, and time  well-developed and well-nourished  HENT:   Head: Normocephalic and atraumatic  Right Ear: External ear normal    Left Ear: External ear normal    Nose: Nose normal    Eyes: Conjunctivae are normal  Right eye exhibits no discharge  Left eye exhibits no discharge  No scleral icterus  Neck: Neck supple  Pulmonary/Chest: Effort normal  No respiratory distress  Neurological: alert and oriented to person, place, and time  Psychiatric:  normal mood and affect   behavior is normal  Judgment and thought content normal      Ortho Exam    BACK EXAM:  Gait: normal, no trendelenberg gait, no antalgic gait    BACK TENDERNESS:  Spinous Processes: no  Paraspinal Muscles: no  SI Joint: no  Sacrum: no  Positive tenderness piriformis muscle that reproduces patient's chief complaint of pain    ROM:  Flexion: intact  Extension: intact  Sidebending: intact    DERMATOMAL SENSATION:  L1: normal   L2: normal   L3: normal   L4: normal   L5: normal   S1: normal    STRENGTH (bilateral):  Knee Extension: 5/5  Knee Flexion: 5/5  Foot Dorsiflexion: 5/5  Great Toe Extension: 5/5  Foot Plantarflexion: 5/5  Hip Flexion: 5/5  Hip Abduction: 5/5  Positive pain reproduced with resisted isometric external hip rotation in buttock region    REFLEXES:  Patellar: 2+ bilateral  Achilles: 2+ bilateral  Clonus: negative bilateral    BACK:   SUPINE STRAIGHT LEG: negative  PRONE STRAIGHT LEG:  SLUMP: negative    HIP:  LOG ROLL: negative  REBEL: negative  FADIR: negative          Procedures

## 2018-05-03 NOTE — PATIENT INSTRUCTIONS
Educated risks of mixing NSAIDS (also known as non-steroidal anti-inflammatory pills) including advil, ibuprofen, motrin, aleve, naproxen, aspirin, and ibuprofen containing products with each other or with steroids (such as prednisone, medrol)  Explained risks of mixing these medications including stomach ulcer, severe internal bleeding, and kidney failure  Instructed not to take NSAIDS if have history of stomach ulcers, kidney issues, or hypertension  Instructed patient to use only one brand as prescribed  For naproxen, a maximum of 500 mg per dose every 12 hours and no more than two doses or 1,000mg per day  For Ibuprofen, a maximum of 800 mg per dose every 6 hours but no more than 3 doses or 2,400 mg per day  Never take these medications together  Never take these medications the same day  For severe pain and only if you have no liver problems, you may add Tylenol (also known as acetaminophen) maximum of 1,000  Mg per dose every 6 hours but no more 3 doses or 3,000 mg per day  Patient expressed understanding and agreed to plan

## 2018-07-16 ENCOUNTER — OFFICE VISIT (OUTPATIENT)
Dept: NEUROLOGY | Facility: CLINIC | Age: 28
End: 2018-07-16
Payer: COMMERCIAL

## 2018-07-16 VITALS
BODY MASS INDEX: 25.5 KG/M2 | SYSTOLIC BLOOD PRESSURE: 122 MMHG | HEART RATE: 65 BPM | DIASTOLIC BLOOD PRESSURE: 80 MMHG | WEIGHT: 188 LBS

## 2018-07-16 DIAGNOSIS — G44.099 TRIGEMINAL AUTONOMIC CEPHALGIAS: Primary | ICD-10-CM

## 2018-07-16 DIAGNOSIS — G93.0 SUBARACHNOID CYST: ICD-10-CM

## 2018-07-16 DIAGNOSIS — H57.02 EPISODIC ANISOCORIA: ICD-10-CM

## 2018-07-16 DIAGNOSIS — R03.0 PRE-HYPERTENSION: ICD-10-CM

## 2018-07-16 DIAGNOSIS — H53.9 VISION CHANGES: ICD-10-CM

## 2018-07-16 PROCEDURE — 99214 OFFICE O/P EST MOD 30 MIN: CPT | Performed by: PHYSICIAN ASSISTANT

## 2018-07-16 RX ORDER — VERAPAMIL HYDROCHLORIDE 100 MG/1
100 CAPSULE, EXTENDED RELEASE ORAL
Qty: 90 CAPSULE | Refills: 1 | Status: SHIPPED | OUTPATIENT
Start: 2018-07-16 | End: 2019-03-20 | Stop reason: SDUPTHER

## 2018-07-16 NOTE — PROGRESS NOTES
Patient ID: Sade Ascencio is a 28 y o  male  Assessment/Plan:    No problem-specific Assessment & Plan notes found for this encounter  Diagnoses and all orders for this visit:    Trigeminal autonomic cephalgias  -     TSH, 3rd generation with T4 reflex; Future  -     Vitamin B12; Future  -     Vitamin D 1,25 dihydroxy; Future  -     Comprehensive metabolic panel; Future  -     CBC and differential; Future  -     verapamil (VERELAN PM) 100 MG 24 hr capsule; Take 1 capsule (100 mg total) by mouth daily at bedtime    Episodic anisocoria    Vision changes    Subarachnoid cyst    Pre-hypertension         TAC most likely cause of episodic pupil dilation sometimes a/w with mild to moderate headache and increased lacrimation  Today neurological exam non lateralizing/ non focal as it was last time  He will increase Verapamil slightly to 100 mg ER dose qd, mainly for ease of taking one dose daily and also because he is still having low grade headaches, which verapamil may improve if dose is increased  Discussed s/e including constipation, hypotension  MRI bran with no significant abnormalities- 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 and likely non contributory to his above symptoms)- If future repeat MRI needed recommended thin cuts through this area  He was urged to call us if he experiences any new or worsening neurological sxs and to keep a journal of the headaches, anisocoria and visual sxs for record and to ID any possible triggers or patterns  Subjective:    HPI     Sade Ascencio is an extremely pleasant 29 yo male who presents for neurological follow-up regarding visual changes  He was last seen by Dr Shruthi Pritchett 12/5/2017  Since last seen the patient started verapamil and he continues 40 mg b i d   Without any side effects  One week after starting verapamil he had a 2nd visual aura, but then no further visual auras after that  He continues to have low-grade headaches about 2-3 times per week for which he does not feel the need to take any medication, but they are "annoying  "  Again he has not had any further visual auras  In the past he had to, which appeared as a yellow spot  The center field of vision that got progressively bigger and brighter, and after about 15-20 minutes the light resolved  He never had a headache following these auras  His wife will sometimes tell him that his right pupil is dilated compared to the left, but he notes that he never has a headache associated with this  He denies eyelid droop, lacrimation, injection , numbness or tingling, or any other  Autonomic or neurological symptoms of the face  Per record it states that the left pupil is usually dilated, but he thinks it was the right  Exam normal today  He did take a "selfie" and it was the left that was dilated  EEG normal- r/o focal seizures  He specifically denies any seizure like activity or aura  EEG on 12/20/17 reveals: "normal 34 minutes awake and drowsy EEG  Hyperventilation induced high amplitude rhythmic delta activity is unusual for the patient's age group but is not considered a pathological finding  Mayur Maalaea is no focal slowing from hyperventilation  Given the association of hyperventilation induced vasoconstriction of the intracranial vessels, it is reasonable to consider evaluation of the patency of the intracranial vessels "    CTA head and neck unremarkable except for SA cyst as noted  Brain MRI unremarkable except for SA cyst as noted, incidental     PMH:  States the first time he noted the visual changes was about six months ago when he was working out, initially had starry vision and went home, he started noting increasingly growing [place] in his visual field that persisted with him closing either eye   States his peripheral vision kept getting narrower  States lasted 2-3 hours  States no headache with this  States his wife noted his right pupil was larger than left pupil  States he almost went to the ER, and states this resolved by the time he got there thus did not  States he saw opthalmology and no abnormalities per them  States he had another event in November, where he got home and his wife noted transient right pupil dilation- states this was reactive per his wife  States mild to moderate headache with this  States increased tearing of his right eye in association  States since 3-4 weeks ago has had twice weekly headaches, associated with headache and tearing- lasting 2-3 days  States he can work through these headaches- take Advil and goes about his day  States headaches new- left sided frontal- dull achy, no photo or phonophobia, nausea or vomiting  No triggers  No change with position  States usually notes it in afternoon  Not worse or triggered by activity, cough, BM, sneezing, chewing or lying down for a prolonged period  No other associated neurologic deficits other than that noted above  States other than his first episode, has not had actual vision change- his wife just notes pupil abnormality and he notes increased tearing and headaches  States Advil 400 or 600 mg taken 2-3 days a week can help at times  States paternal grandfather and maternal grandmother with stroke- states does not know if they had an aneurysm  States family history of MS in brother  ---    The following portions of the patient's history were reviewed and updated as appropriate: He  has a past medical history of Hypertension and Migraines    He   Patient Active Problem List    Diagnosis Date Noted    Episodic anisocoria 07/17/2018    Vision changes 07/17/2018    Subarachnoid cyst 07/17/2018    Pre-hypertension 07/17/2018    Trigeminal autonomic cephalgias 07/16/2018     He  has a past surgical history that includes Colonoscopy and pr repair ing hernia,5+y/o,reducibl (Right, 3/22/2018)  His family history includes Coronary artery disease in his father; Hypertension in his father and mother  He  reports that he has never smoked  He has never used smokeless tobacco  He reports that he does not drink alcohol or use drugs  Current Outpatient Prescriptions   Medication Sig Dispense Refill    verapamil (VERELAN PM) 100 MG 24 hr capsule Take 1 capsule (100 mg total) by mouth daily at bedtime 90 capsule 1     No current facility-administered medications for this visit  No current outpatient prescriptions on file prior to visit  No current facility-administered medications on file prior to visit  He is allergic to iv dye [iodinated diagnostic agents]            Objective:    Blood pressure 122/80, pulse 65, weight 85 3 kg (188 lb)  Physical Exam   Constitutional: He is oriented to person, place, and time  He appears well-developed and well-nourished  HENT:   Head: Normocephalic and atraumatic  Neck: Normal range of motion  Neck supple  Musculoskeletal: Normal range of motion  5/5 t/o  Neurological: He is alert and oriented to person, place, and time  No cranial nerve deficit  Coordination normal    Sensation intact to LT in all 4 extr  Reflexes 2+/symm t/o  Normal gait is steady  Can walk in tandem w/o difficulty  Psychiatric: He has a normal mood and affect  His behavior is normal  Judgment and thought content normal    Nursing note and vitals reviewed  Neurological Exam      ROS:    Review of Systems   Constitutional: Negative  Negative for appetite change and fever  HENT: Negative  Negative for hearing loss, tinnitus, trouble swallowing and voice change  Eyes: Negative  Negative for photophobia and pain  Respiratory: Negative  Negative for shortness of breath  Cardiovascular: Negative  Negative for palpitations  Gastrointestinal: Negative  Negative for nausea and vomiting  Endocrine: Negative  Negative for cold intolerance and heat intolerance  Genitourinary: Negative  Negative for dysuria, frequency and urgency  Musculoskeletal: Negative  Negative for myalgias and neck pain  Skin: Negative  Negative for rash  Neurological: Negative  Negative for dizziness, tremors, seizures, syncope, facial asymmetry, speech difficulty, weakness, light-headedness, numbness and headaches  Hematological: Negative  Does not bruise/bleed easily  Psychiatric/Behavioral: Negative  Negative for confusion, hallucinations and sleep disturbance  Review of systems, Past medical history, Surgical history, Family history, Social history and Medication history were reviewed and otherwise unremarkable from a neurological perspective

## 2018-07-17 PROBLEM — R03.0 PRE-HYPERTENSION: Status: ACTIVE | Noted: 2018-07-17

## 2018-07-17 PROBLEM — H53.9 VISION CHANGES: Status: ACTIVE | Noted: 2018-07-17

## 2018-07-17 PROBLEM — G93.0 SUBARACHNOID CYST: Status: ACTIVE | Noted: 2018-07-17

## 2018-07-17 PROBLEM — H57.02 EPISODIC ANISOCORIA: Status: ACTIVE | Noted: 2018-07-17

## 2018-07-25 ENCOUNTER — APPOINTMENT (OUTPATIENT)
Dept: LAB | Facility: CLINIC | Age: 28
End: 2018-07-25
Payer: COMMERCIAL

## 2018-07-25 DIAGNOSIS — G44.099 TRIGEMINAL AUTONOMIC CEPHALGIAS: ICD-10-CM

## 2018-07-25 LAB
ALBUMIN SERPL BCP-MCNC: 3.8 G/DL (ref 3.5–5)
ALP SERPL-CCNC: 66 U/L (ref 46–116)
ALT SERPL W P-5'-P-CCNC: 34 U/L (ref 12–78)
ANION GAP SERPL CALCULATED.3IONS-SCNC: 4 MMOL/L (ref 4–13)
AST SERPL W P-5'-P-CCNC: 16 U/L (ref 5–45)
BASOPHILS # BLD MANUAL: 0.11 THOUSAND/UL (ref 0–0.1)
BASOPHILS NFR MAR MANUAL: 2 % (ref 0–1)
BILIRUB SERPL-MCNC: 0.5 MG/DL (ref 0.2–1)
BUN SERPL-MCNC: 16 MG/DL (ref 5–25)
CALCIUM SERPL-MCNC: 9.4 MG/DL (ref 8.3–10.1)
CHLORIDE SERPL-SCNC: 104 MMOL/L (ref 100–108)
CO2 SERPL-SCNC: 31 MMOL/L (ref 21–32)
CREAT SERPL-MCNC: 0.99 MG/DL (ref 0.6–1.3)
EOSINOPHIL # BLD MANUAL: 0.11 THOUSAND/UL (ref 0–0.4)
EOSINOPHIL NFR BLD MANUAL: 2 % (ref 0–6)
ERYTHROCYTE [DISTWIDTH] IN BLOOD BY AUTOMATED COUNT: 12.2 % (ref 11.6–15.1)
GFR SERPL CREATININE-BSD FRML MDRD: 103 ML/MIN/1.73SQ M
GLUCOSE P FAST SERPL-MCNC: 100 MG/DL (ref 65–99)
HCT VFR BLD AUTO: 43.3 % (ref 36.5–49.3)
HGB BLD-MCNC: 15.2 G/DL (ref 12–17)
LYMPHOCYTES # BLD AUTO: 2.21 THOUSAND/UL (ref 0.6–4.47)
LYMPHOCYTES # BLD AUTO: 42 % (ref 14–44)
MCH RBC QN AUTO: 30.3 PG (ref 26.8–34.3)
MCHC RBC AUTO-ENTMCNC: 35.1 G/DL (ref 31.4–37.4)
MCV RBC AUTO: 86 FL (ref 82–98)
MONOCYTES # BLD AUTO: 0.21 THOUSAND/UL (ref 0–1.22)
MONOCYTES NFR BLD: 4 % (ref 4–12)
NEUTROPHILS # BLD MANUAL: 2.63 THOUSAND/UL (ref 1.85–7.62)
NEUTS BAND NFR BLD MANUAL: 2 % (ref 0–8)
NEUTS SEG NFR BLD AUTO: 48 % (ref 43–75)
PLATELET # BLD AUTO: 254 THOUSANDS/UL (ref 149–390)
PLATELET BLD QL SMEAR: ADEQUATE
PMV BLD AUTO: 9.3 FL (ref 8.9–12.7)
POTASSIUM SERPL-SCNC: 4.1 MMOL/L (ref 3.5–5.3)
PROT SERPL-MCNC: 7.5 G/DL (ref 6.4–8.2)
RBC # BLD AUTO: 5.02 MILLION/UL (ref 3.88–5.62)
SODIUM SERPL-SCNC: 139 MMOL/L (ref 136–145)
TOTAL CELLS COUNTED SPEC: 100
TSH SERPL DL<=0.05 MIU/L-ACNC: 1.23 UIU/ML (ref 0.36–3.74)
VIT B12 SERPL-MCNC: 831 PG/ML (ref 100–900)
WBC # BLD AUTO: 5.26 THOUSAND/UL (ref 4.31–10.16)

## 2018-07-25 PROCEDURE — 80053 COMPREHEN METABOLIC PANEL: CPT

## 2018-07-25 PROCEDURE — 85007 BL SMEAR W/DIFF WBC COUNT: CPT

## 2018-07-25 PROCEDURE — 36415 COLL VENOUS BLD VENIPUNCTURE: CPT

## 2018-07-25 PROCEDURE — 82607 VITAMIN B-12: CPT

## 2018-07-25 PROCEDURE — 84443 ASSAY THYROID STIM HORMONE: CPT

## 2018-07-25 PROCEDURE — 85027 COMPLETE CBC AUTOMATED: CPT

## 2018-07-25 PROCEDURE — 82652 VIT D 1 25-DIHYDROXY: CPT

## 2018-07-27 LAB — 1,25(OH)2D3 SERPL-MCNC: 58.7 PG/ML (ref 19.9–79.3)

## 2018-07-31 ENCOUNTER — OFFICE VISIT (OUTPATIENT)
Dept: URGENT CARE | Facility: MEDICAL CENTER | Age: 28
End: 2018-07-31
Payer: COMMERCIAL

## 2018-07-31 VITALS
SYSTOLIC BLOOD PRESSURE: 141 MMHG | HEART RATE: 97 BPM | BODY MASS INDEX: 24.92 KG/M2 | OXYGEN SATURATION: 100 % | HEIGHT: 73 IN | DIASTOLIC BLOOD PRESSURE: 81 MMHG | TEMPERATURE: 100.8 F | WEIGHT: 188 LBS | RESPIRATION RATE: 16 BRPM

## 2018-07-31 DIAGNOSIS — H10.31 ACUTE CONJUNCTIVITIS OF RIGHT EYE, UNSPECIFIED ACUTE CONJUNCTIVITIS TYPE: Primary | ICD-10-CM

## 2018-07-31 PROCEDURE — S9083 URGENT CARE CENTER GLOBAL: HCPCS | Performed by: PHYSICIAN ASSISTANT

## 2018-07-31 PROCEDURE — G0382 LEV 3 HOSP TYPE B ED VISIT: HCPCS | Performed by: PHYSICIAN ASSISTANT

## 2018-07-31 RX ORDER — CIPROFLOXACIN HYDROCHLORIDE 3.5 MG/ML
SOLUTION/ DROPS TOPICAL
Qty: 5 ML | Refills: 0 | Status: SHIPPED | OUTPATIENT
Start: 2018-07-31 | End: 2019-09-17

## 2018-07-31 NOTE — PROGRESS NOTES
[facility]      NAME: Abbie Mendoza is a 28 y o  male  : 1990    MRN: [id number]  DATE: 2018  TIME: 6:56 PM    Assessment and Plan   Acute conjunctivitis of right eye, unspecified acute conjunctivitis type [H10 31]  1  Acute conjunctivitis of right eye, unspecified acute conjunctivitis type  ciprofloxacin (CILOXAN) 0 3 % ophthalmic solution       Patient Instructions     Complete course of prescribed eye drops to affected eye  Maintain good hand hygiene and avoid swimming pools  Apply warm compresses to affected eye  Avoid sharing napkins, towels, pillow cases, and linens  Avoid contact use during the infection  Follow up with PCP if symptoms persist and/or worsen  Go to opthalmology if any additional symptoms develop or if you are in any acute visual distress (i e blurred vision, loss of vision)  Chief Complaint     Chief Complaint   Patient presents with    Fever     Patient here with fever, chills,  body aches 9hips, knees) for 2 days  Right eye appears reddened and patient reports yellow drainage from same  Also  reports a cough for yellow/ green sputum  History of Present Illness   Abbie Mendoza presents to the clinic c/o      41-year-old male presents for evaluation of fever, body aches for the last 2 days  Patient is a contact lens wearer as well, and states that his eyes have been red for the last 2 days but noticed a yellow/ green discharge from his right eye starting yesterday  He has been wearing contact lenses, took them out at night last night denies any visual deficits in his eye  He denies any pain with extraocular movements  Denies any foreign body sensations in eye  Denies any light sensitivity  Denies any burning urination, abdominal pain, diaphoretic episodes  He has been taking NSAIDs, for fever control  States his wife had similar symptoms a few days earlier  Review of Systems   Review of Systems   Constitutional: Positive for fever  Eyes: Positive for discharge and redness  Negative for photophobia  Respiratory: Negative  Cardiovascular: Negative  Current Medications     Long-Term Prescriptions   Medication Sig Dispense Refill    verapamil (VERELAN PM) 100 MG 24 hr capsule Take 1 capsule (100 mg total) by mouth daily at bedtime 90 capsule 1       Current Allergies     Allergies as of 07/31/2018 - Reviewed 07/31/2018   Allergen Reaction Noted    Iv dye [iodinated diagnostic agents] Hives 03/22/2018            The following portions of the patient's history were reviewed and updated as appropriate: allergies, current medications, past family history, past medical history, past social history, past surgical history and problem list     HISTORICAL INFO:  Past Medical History:   Diagnosis Date    Hypertension     Migraines      Past Surgical History:   Procedure Laterality Date    COLONOSCOPY      LA REPAIR ING HERNIA,5+Y/O,REDUCIBL Right 3/22/2018    Procedure: INGUINAL HERNIA REPAIR;  Surgeon: Asia Wayne MD;  Location: [facility];  Service: General       Objective   /81   Pulse 97   Temp (!) 100 8 °F (38 2 °C) (Tympanic)   Resp 16   Ht 6' 1" (1 854 m)   Wt 85 3 kg (188 lb)   SpO2 100%   BMI 24 80 kg/m²        Physical Exam     Physical Exam   Constitutional: He is oriented to person, place, and time  He appears well-developed and well-nourished  No distress  HENT:   Head: Normocephalic and atraumatic  Right Ear: Tympanic membrane and external ear normal    Left Ear: Tympanic membrane and external ear normal    Nose: Nose normal    Mouth/Throat: Posterior oropharyngeal erythema present  No oropharyngeal exudate or posterior oropharyngeal edema  Eyes: EOM are normal  Pupils are equal, round, and reactive to light  Right eye exhibits discharge (purulent at the medial epicanthal folds) and exudate  Right conjunctiva is injected  Neck: No tracheal deviation present  No thyromegaly present     Cardiovascular: Normal rate, regular rhythm and normal heart sounds  Pulmonary/Chest: Effort normal and breath sounds normal  No respiratory distress  He has no wheezes  He has no rales  Neurological: He is alert and oriented to person, place, and time  Skin: Skin is warm and dry  He is not diaphoretic  Nursing note and vitals reviewed  M*Modal software was used to dictate this note  It may contain errors with dictating incorrect words/spelling  Please contact provider directly for any questions

## 2018-07-31 NOTE — LETTER
July 31, 2018     Patient: Gabi Gabriel   YOB: 1990   Date of Visit: 7/31/2018       To Whom It May Concern: It is my medical opinion that Gabi Gabriel may return to work on 08/03/2018  He may return sooner if symptoms improve  If you have any questions or concerns, please don't hesitate to call           Sincerely,        Gurpreet Chavez PA-C    CC: No Recipients

## 2019-02-19 ENCOUNTER — AMB VIDEO VISIT (OUTPATIENT)
Dept: URGENT CARE | Facility: CLINIC | Age: 29
End: 2019-02-19

## 2019-02-19 DIAGNOSIS — J01.00 ACUTE NON-RECURRENT MAXILLARY SINUSITIS: Primary | ICD-10-CM

## 2019-02-19 RX ORDER — AMOXICILLIN AND CLAVULANATE POTASSIUM 875; 125 MG/1; MG/1
1 TABLET, FILM COATED ORAL EVERY 12 HOURS SCHEDULED
Qty: 14 TABLET | Refills: 0 | Status: SHIPPED | OUTPATIENT
Start: 2019-02-19 | End: 2019-02-26

## 2019-02-19 RX ORDER — FLUTICASONE PROPIONATE 50 MCG
1 SPRAY, SUSPENSION (ML) NASAL DAILY
Qty: 16 G | Refills: 0 | Status: SHIPPED | OUTPATIENT
Start: 2019-02-19 | End: 2019-09-17

## 2019-02-19 NOTE — PATIENT INSTRUCTIONS
Discussed with the patient he needs nasal decongestants  With his history of hypertension he can try Coricidin over-the-counter  We can use Flonase  Discussed technique with the Flonase  If not improved and several days follow up with his family doctor  If symptoms are worse go to the ER

## 2019-02-19 NOTE — PROGRESS NOTES
Video Visit - Yesenia Gonzalez 33 y o  male MRN: [id number]    REQUIRED DOCUMENTATION:       There were no vitals filed for this visit  1  This service was provided via [employer]  2  Provider located at [address] [facility]  [phone]  3  [facility] provider: Rey Miller PA-C   4  Identify all parties in room with patient during [facility] visit:  Yesenia Gonzalez    5  After connecting through [employer], patient was identified by name and date of birth  Patient was then informed that this was a Telemedicine visit and that the exam was being conducted confidentially over secure lines  My office door was closed  No one else was in the room  Patient acknowledged consent and understanding of privacy and security of the Telemedicine visit  I informed the patient that I have reviewed their record in Epic and presented the opportunity for them to ask any questions regarding the visit today  The patient agreed to participate  65-year-old male cause into the video visit system complaining of sinus pain and pressure for 2 days  He has had cough and congestion for about a week  Yesterday he started with a low-grade fever on 100 5  He has chills and body aches  He has pain in his forehead and worsen his cheeks  No dental pain  No history sinus surgery  He is using Advil over-the-counter  No nasal spray use  No history of asthma or inhaler use  No chest pain or shortness of breath  He has intermittent blood in his mucus  A 65-year-old male cause into the video visit system complaining of cough and congestion for 1 week now with sinus pain and pressure over last 2 days  T-max 100 5 at home  He is using Advil over-the-counter  No history of sinus surgery  History of asthma or inhaler use  No chest pain or shortness of breath  He did have intermittent blood in his mucus  He has pain in his cheeks in his forehead      Physical Exam Constitutional: He is oriented to person, place, and time  He appears well-developed and well-nourished  No distress  HENT:     He is tender over bilateral maxillary sinuses when he taps   Pulmonary/Chest: Effort normal    Neurological: He is alert and oriented to person, place, and time  Diagnoses and all orders for this visit:    Acute non-recurrent maxillary sinusitis  -     amoxicillin-clavulanate (AUGMENTIN) 875-125 mg per tablet; Take 1 tablet by mouth every 12 (twelve) hours for 7 days  -     fluticasone (FLONASE) 50 mcg/act nasal spray; 1 spray into each nostril daily      Patient Instructions    Discussed with the patient he needs nasal decongestants  With his history of hypertension he can try Coricidin over-the-counter  We can use Flonase  Discussed technique with the Flonase  If not improved and several days follow up with his family doctor  If symptoms are worse go to the ER

## 2019-02-19 NOTE — LETTER
February 19, 2019     Patient: Wellington Zamudio   YOB: 1990   Date of Visit: 2/19/2019       To Whom it May Concern:    Wellington Zamudio is under my professional care  He was seen in my office on 2/19/2019  He may return to work on 2/20/19 Excuse due to illness  If you have any questions or concerns, please don't hesitate to call           Sincerely,          Radha Carlson, PABetsyC        CC: No Recipients

## 2019-03-01 NOTE — CARE ANYWHERE EVISITS
Visit Summary for Holley Parmar - Gender: Male - Date of Birth: [id number]  Date: [id number] - Duration: 4 minutes  Patient: Holley Parmar  Provider: Dallas Paulino [place]    Patient Contact Information  Address  [address]; [facility]  [phone]    Visit Topics  Fever [Added By: Self - 2019-02-19]  Headache [Added By: Self - 2019-02-19]    Conversation Transcripts  [0A][0A] [Notification] Jessika Ku, [facility], will help you prepare for your visit  She is assisting Dallas Paulino PA-C, Urgent Care Specialist [0A][Jessika Ku] I apologize again for the wait  The provider is currently busy, but will be with   you shortly  [0A][Notification] Jessika Ku has left the room  [0A][Notification] You are connected with Dallas Paulino PA-C, Urgent Care Specialist [0A][Notification] Holley Parmar is located in [address] [place] [address]  [0A][Notification] Holley Parmar has   shared health history  Janice Mcnamara  [0A]    Diagnosis    Procedures  Value: [id number] Code: CPT-4 UNLISTED E&M SERVICE    Medications Prescribed    No prescriptions ordered    Electronically signed by: Kofi Enciso(NPI [id number])

## 2019-03-20 DIAGNOSIS — G44.099 TRIGEMINAL AUTONOMIC CEPHALGIAS: ICD-10-CM

## 2019-03-20 RX ORDER — VERAPAMIL HYDROCHLORIDE 100 MG/1
100 CAPSULE, EXTENDED RELEASE ORAL
Qty: 90 CAPSULE | Refills: 1 | Status: SHIPPED | OUTPATIENT
Start: 2019-03-20 | End: 2019-05-14 | Stop reason: SDUPTHER

## 2019-03-20 NOTE — TELEPHONE ENCOUNTER
Patient called stating he is out of Verapamil  Requesting refill orders entered please approve if appropriate  Patient did schedule f/u appt 7/23/19

## 2019-05-14 DIAGNOSIS — G44.099 TRIGEMINAL AUTONOMIC CEPHALGIAS: ICD-10-CM

## 2019-05-15 RX ORDER — VERAPAMIL HYDROCHLORIDE 100 MG/1
CAPSULE, EXTENDED RELEASE ORAL
Qty: 90 CAPSULE | Refills: 1 | Status: SHIPPED | OUTPATIENT
Start: 2019-05-15 | End: 2019-10-10 | Stop reason: DRUGHIGH

## 2019-07-18 ENCOUNTER — TELEPHONE (OUTPATIENT)
Dept: NEUROLOGY | Facility: CLINIC | Age: 29
End: 2019-07-18

## 2019-07-18 NOTE — TELEPHONE ENCOUNTER
Appointment  On Tues 7/23 has to be rescheduled due to [facility] stepping out of the office during the afternoon  LMOM for a return call  There are available appointments on Mon 7/23 as well a morning appointment on Tues 7/22

## 2019-07-22 ENCOUNTER — OFFICE VISIT (OUTPATIENT)
Dept: NEUROLOGY | Facility: CLINIC | Age: 29
End: 2019-07-22
Payer: COMMERCIAL

## 2019-07-22 VITALS
BODY MASS INDEX: 25.07 KG/M2 | SYSTOLIC BLOOD PRESSURE: 136 MMHG | HEART RATE: 70 BPM | WEIGHT: 190 LBS | DIASTOLIC BLOOD PRESSURE: 72 MMHG

## 2019-07-22 DIAGNOSIS — H57.02 EPISODIC ANISOCORIA: ICD-10-CM

## 2019-07-22 DIAGNOSIS — G43.109 MIGRAINE WITH AURA AND WITHOUT STATUS MIGRAINOSUS, NOT INTRACTABLE: ICD-10-CM

## 2019-07-22 DIAGNOSIS — G93.0 SUBARACHNOID CYST: ICD-10-CM

## 2019-07-22 DIAGNOSIS — G44.099 TRIGEMINAL AUTONOMIC CEPHALGIAS: Primary | ICD-10-CM

## 2019-07-22 PROCEDURE — 99214 OFFICE O/P EST MOD 30 MIN: CPT | Performed by: PHYSICIAN ASSISTANT

## 2019-07-22 NOTE — PROGRESS NOTES
Patient ID: Biju Otero is a 33 y o  male  Assessment/Plan:       Problem List Items Addressed This Visit        Cardiovascular and Mediastinum    Migraine with aura and without status migrainosus, not intractable       Nervous and Auditory    Subarachnoid cyst     "Incidentally noted small arachnoid cyst peripheral to the left cerebellar hemisphere "  This is congenital and asymptomatic; Not a cause for anisocoria or headaches      Noted right occipital cortex gyral asymmetry (? 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 and likely non contributory to his above symptoms)- If future repeat MRI needed recommended thin cuts through this area  Other    Trigeminal autonomic cephalgias - Primary    Episodic anisocoria            TAC most likely cause of episodic pupil dilation sometimes a/w with mild to moderate headache and increased lacrimation  Neurological exam non lateralizing/ non focal as it was last time  He denies any headaches or auras since last seen  No need to repeat brain MRI unless symptoms worsen or change  He is asking to come off of verapamil if possible  I instructed him to continue verapamil 100 mg daily for now, and if he continues to be headache free by oct/nov he can start to slowly wean by taking 40 mg BID x 2-3 weeks, then 40 mg qhs x 2-3 weeks, then stop  If at any point the headaches worsen he should resume the lowest effective dose  BP has been a problem in the past so we will have to monitor this as well after weaning  The patient should not hesitate to call me prior to his follow up with any questions or concerns  The patient was instructed to urgently call 911 or present to the nearest emergency room with any new or worsening neurological deficits  Subjective:    HPI    Mr Biju Otero is an extremely pleasant 32 yo male who presents for neurological follow-up regarding visual changes  Since last seen he has been doing well   He denies any headaches at all  He denies any visual auras  He remains on 100 mg ER qhs and denies s/e  He has not noticed pupillary dilation since last seen  He stays active with regular aerobic exercise and eating healthy, drinking enough water t/o the day as well  He denies any new or worsening neurological deficits  Past history: One week after starting verapamil he had a 2nd visual aura, but then no further visual auras after that  In the past he had visual auras which appeared as yellow spots; The center field of vision became progressively bigger and brighter, and after about 15-20 minutes the light resolved  He never had a headache following these auras  His wife will sometimes tell him that his right pupil is dilated compared to the left, but he notes that he never has a headache associated with this  He denies eyelid droop, lacrimation, injection , numbness or tingling, or any other  Autonomic or neurological symptoms of the face  Per record it states that the left pupil is usually dilated, but he thinks it was the right  Exam normal today  He did take a "selfie" and it was the left that was dilated      EEG normal- r/o focal seizures  He specifically denies any seizure like activity or aura  EEG on 12/20/17 reveals: "normal 34 minutes awake and drowsy EEG  Hyperventilation induced high amplitude rhythmic delta activity is unusual for the patient's age group but is not considered a pathological finding  Steven Nash is no focal slowing from hyperventilation  Given the association of hyperventilation induced vasoconstriction of the intracranial vessels, it is reasonable to consider evaluation of the patency of the intracranial vessels  "     CTA head and neck unremarkable except for SA cyst as noted  Brain MRI unremarkable except for SA cyst as noted, incidental     Please see last office note for further information- dated 7/16/18      States paternal grandfather and maternal grandmother with stroke- states does not know if they had an aneurysm  States family history of MS in brother  The following portions of the patient's history were reviewed and updated as appropriate: He  has a past medical history of Hypertension and Migraines  He   Patient Active Problem List    Diagnosis Date Noted    Migraine with aura and without status migrainosus, not intractable 07/24/2019    Episodic anisocoria 07/17/2018    Subarachnoid cyst 07/17/2018    Pre-hypertension 07/17/2018    Trigeminal autonomic cephalgias 07/16/2018     He  has a past surgical history that includes Colonoscopy and pr repair ing hernia,5+y/o,reducibl (Right, 3/22/2018)  His family history includes Coronary artery disease in his father; Hypertension in his father and mother  He  reports that he has never smoked  He has never used smokeless tobacco  He reports that he does not drink alcohol or use drugs  Current Outpatient Medications   Medication Sig Dispense Refill    verapamil (VERELAN PM) 100 MG 24 hr capsule TAKE 1 CAPSULE DAILY AT BEDTIME 90 capsule 1    ciprofloxacin (CILOXAN) 0 3 % ophthalmic solution Apply 2 drops into the conjunctival sac q2 hours while awake on day 1-2  1-2 drops q4hr while away day 3-7 (Patient not taking: Reported on 7/22/2019) 5 mL 0    fluticasone (FLONASE) 50 mcg/act nasal spray 1 spray into each nostril daily (Patient not taking: Reported on 7/22/2019) 16 g 0     No current facility-administered medications for this visit  He is allergic to iv dye [iodinated diagnostic agents]            Objective:    Blood pressure 136/72, pulse 70, weight 86 2 kg (190 lb)  Physical Exam    Neurological Exam  Vital signs reviewed  Well developed, well nourished  Head: Normocephalic, atraumatic  Neck: Neck flexors 5/5  CN 2-12: intact and symmetric, including EOMs which are normal b/l and PERRL- both pupils are 5-6 mm  Fundi b/l are normal to crude ophthalmological examination  MSK: 5/5 t/o  ROM normal x all 4 extr   No pronator drift   Sensation: Inact to LT and temp x4 extr  Reflexes: 2+ and symmetric in all 4 extr  Coordination: Nml x4 extr  Gait: Steady normal gait  ROS:    Review of Systems   Constitutional: Negative  Negative for appetite change and fever  HENT: Negative  Negative for hearing loss, tinnitus, trouble swallowing and voice change  Eyes: Negative  Negative for photophobia and pain  Respiratory: Negative  Negative for shortness of breath  Cardiovascular: Negative  Negative for palpitations  Gastrointestinal: Negative  Negative for nausea and vomiting  Endocrine: Negative  Negative for cold intolerance and heat intolerance  Genitourinary: Negative  Negative for dysuria, frequency and urgency  Musculoskeletal: Negative  Negative for myalgias and neck pain  Skin: Negative  Negative for rash  Neurological: Negative  Negative for dizziness, tremors, seizures, syncope, facial asymmetry, speech difficulty, weakness, light-headedness, numbness and headaches  Hematological: Negative  Does not bruise/bleed easily  Psychiatric/Behavioral: Negative  Negative for confusion, hallucinations and sleep disturbance  The following portions of the patient's history were reviewed and updated as appropriate: allergies, current medications/ medication history, past family history, past medical history, past social history, past surgical history and problem list     Review of systems was reviewed and otherwise unremarkable from a neurological perspective

## 2019-07-24 PROBLEM — G43.109 MIGRAINE WITH AURA AND WITHOUT STATUS MIGRAINOSUS, NOT INTRACTABLE: Status: ACTIVE | Noted: 2019-07-24

## 2019-07-24 PROBLEM — H53.9 VISION CHANGES: Status: RESOLVED | Noted: 2018-07-17 | Resolved: 2019-07-24

## 2019-07-24 NOTE — ASSESSMENT & PLAN NOTE
"Incidentally noted small arachnoid cyst peripheral to the left cerebellar hemisphere "  This is congenital and asymptomatic; Not a cause for anisocoria or headaches      Noted right occipital cortex gyral asymmetry (? 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

## 2019-08-12 ENCOUNTER — TELEPHONE (OUTPATIENT)
Dept: DERMATOLOGY | Facility: CLINIC | Age: 29
End: 2019-08-12

## 2019-09-11 ENCOUNTER — OFFICE VISIT (OUTPATIENT)
Dept: DERMATOLOGY | Facility: CLINIC | Age: 29
End: 2019-09-11
Payer: COMMERCIAL

## 2019-09-11 VITALS — BODY MASS INDEX: 25.25 KG/M2 | TEMPERATURE: 98.6 F | WEIGHT: 186.4 LBS | HEIGHT: 72 IN

## 2019-09-11 DIAGNOSIS — D48.5 NEOPLASM OF UNCERTAIN BEHAVIOR OF SKIN: Primary | ICD-10-CM

## 2019-09-11 PROCEDURE — 11102 TANGNTL BX SKIN SINGLE LES: CPT | Performed by: DERMATOLOGY

## 2019-09-11 PROCEDURE — 88305 TISSUE EXAM BY PATHOLOGIST: CPT | Performed by: PATHOLOGY

## 2019-09-11 PROCEDURE — 99204 OFFICE O/P NEW MOD 45 MIN: CPT | Performed by: DERMATOLOGY

## 2019-09-11 NOTE — PROGRESS NOTES
[address] [facility] Note     Patient Name: Herbie Aguilar  Encounter Date: 09/11/2019    Today's Chief Concerns:  [facility] Concern #1:  Scab on ear       Past Medical History:  Have you ever had or currently have any of the following medical conditions or treatments? · HIV/AIDS: No  · Hepatitis B: No  · Hepatitis C: No   · Diabetes: No  · Tuberculosis: No  · Biologic Therapy/Chemotherapy: No  · Organ or Bone Marrow Transplantation: No  · Radiation Treatment: No  · Cancer (If Yes, which types)- No   · Hypertension: Yes      Have you ever had any of the following skin conditions? · Melanoma? (If Yes, please provide more detail)- No  · Basal Cell Carcinoma: No  · Squamous Cell Carcinoma: No  · Sebaceous Cell Carcinoma: No  · Merkel Cell Carcinoma: No  · Angiosarcoma: No  · Blistering Sunburns: No  · Eczema: YES  · Psoriasis: No    Social History:    What is your current Smoking Status? 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 Family history:  Do any of your "first degree relatives" (parent, brother, sister, or child) have any of the following conditions? · Melanoma? (If Yes, which relatives?) No  · Eczema: No  · Asthma: No  · Hay Fever/Seasonal Allergies: No  · Psoriasis: No  · Arthritis: No  · Thyroid Problems: YES  · Lupus/Connective Tissue Disease: No  · Diabetes: No  · Stroke: No  · Blood Clots: No  · IBD/Crohn's/Ulcerative Colitis: No  · Vitiligo: No  · Scarring/Keloids: No  · Severe Acne: No  · Pancreatic Cancer: No  · Other known Skin Condition? If Yes, what condition and which relatives?   YES, basal cell mother   · Hypertension: Yes  · CAD: Yes    Current Medications:    Current Outpatient Medications:     verapamil (VERELAN PM) 100 MG 24 hr capsule, TAKE 1 CAPSULE DAILY AT BEDTIME, Disp: 90 capsule, Rfl: 1    ciprofloxacin (CILOXAN) 0 3 % ophthalmic solution, Apply 2 drops into the conjunctival sac q2 hours while awake on day 1-2  1-2 drops q4hr while away day 3-7 (Patient not taking: Reported on 7/22/2019), Disp: 5 mL, Rfl: 0    fluticasone (FLONASE) 50 mcg/act nasal spray, 1 spray into each nostril daily (Patient not taking: Reported on 7/22/2019), Disp: 16 g, Rfl: 0    Specific Alerts:    Have you been seen by a [facility] Dermatologist in the last 3 years? No    Are you pregnant or planning to become pregnant? N/A    Are you currently or planning to be nursing or breast feeding? N/A    Allergies   Allergen Reactions    Iv Dye [Iodinated Diagnostic Agents] Hives       May we call your Preferred Phone number to discuss your specific medical information? YES    May we leave a detailed message that includes your specific medical information? YES    Have you traveled outside of the [facility] in the past 3 months? No    Do you currently have a pacemaker or defibrillator? No    Do you have any artificial heart valves, joints, plates, screws, rods, stents, pins, etc? No   - If Yes, were any placed within the last 2 years? Do you require any medications prior to a surgical procedure? No   - If Yes, for which procedure? - If Yes, what medications to you require? Are you taking any medications that cause you to bleed more easily ("blood thinners") No    Have you ever experienced a rapid heartbeat with epinephrine? No    Have you ever been treated with "gold" (gold sodium thiomalate) therapy? No    [facility] can help with wrinkles, "laugh lines," facial volume loss, "double chin," "love handles," age spots, and more  Are you interested in learning today about some of the skin enhancement procedures that we offer? (If Yes, please provide more detail) No    Review of Systems:  Have you recently had or currently have any of the following?     · Fever or chills: No  · Night Sweats: No  · Headaches: No  · Weight Gain: {No  · Weight Loss: No  · Blurry Vision: No  · Nausea: No  · Vomiting: No  · Diarrhea: No  · Blood in Stool: No  · Abdominal Pain: No  · Itchy Skin: No  · Painful Joints: No  · Swollen Joints: No  · Muscle Pain: No  · Irregular Mole: No  · Sun Burn: No  · Dry Skin: No  · Skin Color Changes: No  · Scar or Keloid: No  · Cold Sores/Fever Blisters: No  · Bacterial Infections/MRSA: No  · Anxiety: No  · Depression: No  · Suicidal or Homicidal Thoughts: No      PHYSICAL EXAM:      Was a chaperone (Derm Clinical Assistant) present for the entirety of the Physical Exam? YES    Did the Dermatology Team specifically ask and  the patient on the importance of a Full Skin Exam to be sure that nothing is missed clinically?  YES    Did the patient request or accept a Full Skin Exam?  No    Did the patient specifically refuse to have the areas "under-the-bra" examined by the Dermatologist? Nithin Robles    Did the patient specifically refuse to have the areas "under-the-underwear" examined by the Dermatologist? No      CONSTITUTIONAL:   Vitals:    09/11/19 1515   Temp: 98 6 °F (37 °C)   Weight: 84 6 kg (186 lb 6 4 oz)   Height: 6' (1 829 m)           PSYCH: Normal mood and affect  EYES: Normal conjunctiva  ENT: Normal lips and oral mucosa  CARDIOVASCULAR: No edema  RESPIRATORY: Normal respirations  HEME/LYMPH/IMMUNO:  No regional lymphadenopathy except as noted below in [address] (SKIN)  Hair, Scalp, Ears, Face Normal except as noted below in Assessment   Neck, Cervical Chain Nodes Normal except as noted below in Assessment   Right Arm/Hand/Fingers Normal except as noted below in Assessment   Left Arm/Hand/Fingers Normal except as noted below in Assessment   Chest/Breasts/Axillae Viewed areas Normal except as noted below in Assessment   Abdomen, Umbilicus Normal except as noted below in Assessment   Back/Spine Normal except as noted below in Assessment   Groin/Genitalia/Buttocks Viewed areas Normal except as noted below in Assessment   Right Leg, Foot, Toes Normal except as noted below in Assessment   Left Leg, Foot, Toes Normal except as noted below in Assessment        ASSESSMENT AND PLAN BY DIAGNOSIS:    History of Present Condition:     Duration:  How long has this been an issue for you?    o  1 year   Location Affected:  Where on the body is this affecting you?    o  left ear   Quality:  Is there any bleeding, pain, itch, burning/irritation, or redness associated with the skin lesion? o  bleeding    Severity:  Describe any bleeding, pain, itch, burning/irritation, or redness on a scale of 1 to 10 (with 10 being the worst)  o  3   Timing:  Does this condition seem to be there pretty constantly or do you notice it more at specific times throughout the day? o  off and on   Context:  Have you ever noticed that this condition seems to be associated with specific activities you do?    o  denies    Modifying Factors:    o Anything that seems to make the condition worse?    -  denies   o What have you tried to do to make the condition better?    -  denies   Associated Signs and Symptoms:  Does this skin lesion seem to be associated with any of the following:  o  DERM ASSOCIATED SIGNS AND SYMPTOMS: Bleeding   1  NEOPLASM OF UNCERTAIN BEHAVIOR OF SKIN    Physical Exam:   (Anatomic Location); (Size and Morphological Description); (Differential Diagnosis):  o  Left ear apex 0 6 mm crusted lucent plaque: Differential diagnosis: actinic keratosis versus basal cell carcinoma   Pertinent Positives:   Pertinent Negatives: Additional History of Present Condition:  Patient states he has bleeding off and on  Patient mother has a history of basal cell carcinoma  Assessment and Plan:   I have discussed with the patient that a sample of skin via a "skin biopsy would be potentially helpful to further make a specific diagnosis under the microscope     Based on a thorough discussion of this condition and the management approach to it (including a comprehensive discussion of the known risks, side effects and potential benefits of treatment), the patient (family) agrees to implement the following specific plan:    o Procedure:  Skin Biopsy  After a thorough discussion of treatment options and risk/benefits/alternatives (including but not limited to local pain, scarring, dyspigmentation, blistering, possible superinfection, and inability to confirm a diagnosis via histopathology), verbal and written consent were obtained and portion of the rash was biopsied for tissue sample  See below for consent that was obtained from patient and subsequent Procedure Note  PROCEDURE SHAVE BIOPSY NOTE:     Performing Physician: Dr Tubbs   Anatomic Location; Clinical Description with size (cm); Pre-Op Diagnosis:  o Left ear apex 0 6 mm crusted lucent plaque: Differential diagnosis: actinic keratosis versus basal cell carcinoma   Post-op diagnosis: Same      Local anesthesia: 1% xylocaine with epi       Topical anesthesia: None     Hemostasis: Aluminum chloride       After obtaining informed consent  at which time there was a discussion about the purpose of biopsy  and low risks of infection and bleeding  The area was prepped and draped in the usual fashion  Anesthesia was obtained with 1% lidocaine with epinephrine  A shave biopsy to an appropriate sampling depth was obtained with a sterile blade (such as a 15-blade or DermaBlade)  The resulting wound was covered with surgical ointment and bandaged appropriately  The patient tolerated the procedure well without complications and was without signs of functional compromise  Specimen has been sent for review by Dermatopathology  Standard post-procedure care has been explained and has been included in written form within the patient's copy of Informed Consent      INFORMED CONSENT DISCUSSION AND POST-OPERATIVE INSTRUCTIONS FOR PATIENT    I   RATIONALE FOR PROCEDURE  I understand that a skin biopsy allows the Dermatologist to test a lesion or rash under the microscope to obtain a diagnosis  It usually involves numbing the area with numbing medication and removing a small piece of skin; sometimes the area will be closed with sutures  In this specific procedure, sutures are not usually needed  If any sutures are placed, then they are usually need to be removed in 2 weeks or less  I understand that my Dermatologist recommends that a skin "shave" biopsy be performed today  A local anesthetic, similar to the kind that a dentist uses when filling a cavity, will be injected with a very small needle into the skin area to be sampled  The injected skin and tissue underneath "will go to sleep and become numb so no pain should be felt afterwards  An instrument shaped like a tiny "razor blade" (shave biopsy instrument) will be used to cut a small piece of tissue and skin from the area so that a sample of tissue can be taken and examined more closely under the microscope  A slight amount of bleeding will occur, but it will be stopped with direct pressure and a pressure bandage and any other appropriate methods  I understands that a scar will form where the wound was created  Surgical ointment will be applied to help protect the wound  Sutures are not usually needed      II   RISKS AND POTENTIAL COMPLICATIONS   I understand the risks and potential complications of a skin biopsy include but are not limited to the following:   Bleeding   Infection   Pain   Scar/keloid   Skin discoloration   Incomplete Removal   Recurrence   Nerve Damage/Numbness/Loss of Function   Allergic Reaction to Anesthesia   Biopsies are diagnostic procedures and based on findings additional treatment or evaluation may be required   Loss or destruction of specimen resulting in no additional findings    My Dermatologist has explained to me the nature of the condition, the nature of the procedure, and the benefits to be reasonably expected compared with alternative approaches  My Dermatologist has discussed the likelihood of major risks or complications of this procedure including the specific risks listed above, such as bleeding, infection, and scarring/keloid  I understand that a scar is expected after this procedure  I understand that my physician cannot predict if the scar will form a "keloid," which extends beyond the borders of the wound that is created  A keloid is a thick, painful, and bumpy scar  A keloid can be difficult to treat, as it does not always respond well to therapy, which includes injecting cortisone directly into the keloid every few weeks  While this usually reduces the pain and size of the scar, it does not eliminate it  I understand that photographs may be taken before and after the procedure  These will be maintained as part of the medical providers confidential records and may not be made available to me  I further authorize the medical provider to use the photographs for teaching purposes or to illustrate scientific papers, books, or lectures if in his/her judgment, medical research, education, or science may benefit from its use  I have had an opportunity to fully inquire about the risks and benefits of this procedure and its alternatives  I have been given ample time and opportunity to ask questions and to seek a second opinion if I wished to do so  I acknowledge that there have specifically been no guarantees as to the cosmetic results from the procedure  I am aware that with any procedure there is always the possibility of an unexpected complication  III  POST-PROCEDURAL CARE (WHAT YOU WILL NEED TO DO "AFTER THE BIOPSY" TO OPTIMIZE HEALING)     Keep the area clean and dry  Try NOT to remove the bandage or get it wet for the first 24 hours       Gently clean the area and apply surgical ointment (such as Vaseline petrolatum ointment, which is available "over the counter" and not a prescription) to the biopsy site for up to 2 weeks straight  This acts to protect the wound from the outside world   Sutures are not usually placed in this procedure  If any sutures were placed, return for suture removal as instructed (generally 1 week for the face, 2 weeks for the body)   Take Acetaminophen (Tylenol) for discomfort, if no contraindications  Ibuprofen or aspirin could make bleeding worse   Call our office immediately for signs of infection: fever, chills, increased redness, warmth, tenderness, discomfort/pain, or pus or foul smell coming from the wound  WHAT TO DO IF THERE IS ANY BLEEDING? If a small amount of bleeding is noticed, place a clean cloth over the area and apply firm pressure for ten minutes  Check the wound after 10 minutes of direct pressure  If bleeding persists, try one more time for an additional 10 minutes of direct pressure on the area  If the bleeding becomes heavier or does not stop after the second attempt, or if you have any other questions about this procedure, then please call your [facility]'s Dermatologist by calling [phone] (SKIN)  I hereby acknowledge that I have reviewed and verified the site with my Dermatologist and have requested and authorized my Dermatologist to proceed with the procedure        Scribe Attestation    I,:   Clinked am acting as a scribe while in the presence of the attending physician :        I,:   Sheldon Hyatt MD personally performed the services described in this documentation    as scribed in my presence :

## 2019-09-11 NOTE — PATIENT INSTRUCTIONS
SKIN SHAVE BIOPSY  Rationale for Procedure  A skin shave biopsy allows the dermatologist to further examine a lesion or rash under the microscope to potentially obtain a more specific diagnosis  It usually involves numbing the area with numbing medication and removing a small piece of skin  Usually, the area will be closed with sutures at the end of the procedure  Sutures are not usually needed  Description of Procedure  We would like to perform a skin shave biopsy today  A local anesthetic, similar to the kind that a dentist uses when filling a cavity, will be injected with a very small needle into the skin area to be sampled  The injected skin and tissue underneath should go to sleep and become numbed so that no further pain should be felt  An instrument shaped like a tiny "razor blade" (i e , the shave biopsy instrument) will be used to cut a small round piece of skin and tissue from the area so that the sample may be taken and examined more closely under the microscope  A slight amount of bleeding will occur, but it is usually stopped with direct pressure, chemical cautery, and/or a pressure bandage; rarely, electrocautery and other means of intervention may be necessary to help stop the bleeding  Sutures are not usually needed  Surgical Vaseline-type ointment will also applied after the procedure to help create a barrier between the wound and the outside world      Risks and Potential Complications  While the advantage of a skin shave biopsy is that it allows us to potentially examine the skin more closely under the microscope, there are some risks and potential complications that include but are not limited to the following:  - Bleeding  - Infection  - Pain  - Scar/keloid  - Skin discoloration  - Incomplete removal of the lesion or rash being sampled (in other words, this procedure is intended as a sampling and is not considered a definitive treatment)  - Recurrence of the lesion or rash being sampled  - Nerve Damage/Numbness/Loss of Function  - Allergic Reaction to Anesthesia  - Biopsies are diagnostic procedures and, based on findings, additional treatment or evaluation may be required (in other words, this procedure is intended as a sampling and is not considered a definitive treatment)  - Loss or destruction of the sample specimen could result in no additional findings  - The person at the microscope may not be able to provide additional information other than what we already know or suspect  What You Will Need to Do After the Procedure  1  Keep the area clean and dry  Try NOT to remove the bandage for the first 24 hours  2  Gently clean the area and apply Vaseline ointment (this is over the counter and not a prescription) to the biopsy site for up to 2 weeks  3  Generally, sutures are not needed  If any sutures were placed, return for suture removal as instructed (generally 1 week for the face, 2 weeks for the body)  4  Take Acetaminophen (Tylenol) for discomfort, if no contraindications  Do NOT take Ibuprofen or aspirin unless specifically told to do so by your Dermatologist because these medications can make bleeding worse  5  Call our office immediately for signs of infection: fever, chills, increased redness, warmth, tenderness, discomfort/pain, or pus or foul smell coming from the wound  If a small amount of bleeding is noticed, place a clean cloth over the area and apply firm pressure for ten minutes  Check the wound ONLY after 10 minutes of direct pressure; do not cheat and sneak a peak, as that does not count  If bleeding persists after 10 minutes of legitimate direct pressure, then try one more round of direct pressure for an additional 10 minutes to the area  Should the bleeding become heavier or not stop after the second attempt, call [facility] directly at [phone] (SKIN) or, if after hours, go to your local Emergency Room/Emergency Department

## 2019-09-13 ENCOUNTER — TELEPHONE (OUTPATIENT)
Dept: DERMATOLOGY | Facility: CLINIC | Age: 29
End: 2019-09-13

## 2019-09-13 NOTE — TELEPHONE ENCOUNTER
Telephone call to patient to check on biopsy site  Left message on answering machine to call with any concerns

## 2019-09-17 ENCOUNTER — OFFICE VISIT (OUTPATIENT)
Dept: OBGYN CLINIC | Facility: CLINIC | Age: 29
End: 2019-09-17
Payer: COMMERCIAL

## 2019-09-17 VITALS
BODY MASS INDEX: 25.73 KG/M2 | HEIGHT: 72 IN | SYSTOLIC BLOOD PRESSURE: 133 MMHG | WEIGHT: 190 LBS | DIASTOLIC BLOOD PRESSURE: 77 MMHG | HEART RATE: 71 BPM

## 2019-09-17 DIAGNOSIS — M70.62 GREATER TROCHANTERIC BURSITIS OF BOTH HIPS: Primary | ICD-10-CM

## 2019-09-17 DIAGNOSIS — M70.61 GREATER TROCHANTERIC BURSITIS OF BOTH HIPS: Primary | ICD-10-CM

## 2019-09-17 PROCEDURE — 20610 DRAIN/INJ JOINT/BURSA W/O US: CPT | Performed by: ORTHOPAEDIC SURGERY

## 2019-09-17 PROCEDURE — 99213 OFFICE O/P EST LOW 20 MIN: CPT | Performed by: ORTHOPAEDIC SURGERY

## 2019-09-17 RX ORDER — METHYLPREDNISOLONE ACETATE 40 MG/ML
1 INJECTION, SUSPENSION INTRA-ARTICULAR; INTRALESIONAL; INTRAMUSCULAR; SOFT TISSUE
Status: COMPLETED | OUTPATIENT
Start: 2019-09-17 | End: 2019-09-17

## 2019-09-17 RX ORDER — LIDOCAINE HYDROCHLORIDE 10 MG/ML
4 INJECTION, SOLUTION INFILTRATION; PERINEURAL
Status: COMPLETED | OUTPATIENT
Start: 2019-09-17 | End: 2019-09-17

## 2019-09-17 RX ADMIN — LIDOCAINE HYDROCHLORIDE 4 ML: 10 INJECTION, SOLUTION INFILTRATION; PERINEURAL at 15:58

## 2019-09-17 RX ADMIN — METHYLPREDNISOLONE ACETATE 1 ML: 40 INJECTION, SUSPENSION INTRA-ARTICULAR; INTRALESIONAL; INTRAMUSCULAR; SOFT TISSUE at 15:58

## 2019-09-17 NOTE — PROGRESS NOTES
Patient Name:  Vahe Olvera  MRN:  [id number]    Assessment & Plan     Bilateral Greater Trochanteric Bursitis     1  Will provide patient with bilateral greater trochanteric bursa cortisone injections  These are documented below  2  He may perform home exercise program as tolerated  3  May take OTC anti-inflammatories p r n  for pain relief  4  Monitor symptoms in left shoulder  Make follow up appointment if symptoms increase  5  Call if no relief after injection  Will prescribe medrol dosepak before he leaves for his trip this weekend  Chief Complaint     Bilateral Hip Pain      History of the Present Illness     Vahe Olvera is a 26 y o  male presents today for an initial visit for bilateral hip pain  Patient states that his symptoms returned about 3-4 weeks ago after moving but mentions no certain mechanism of injury  He is localizing his symptoms about the lateral aspect of both hips  He states that his symptoms are worse at night, especially while lying on either side  He has been performing home exercises and taking OTC anti inflammatories prn for pain relief with minimal benefit  Patient notes that he saw Dr Nghia Ogden about 1 year ago for the same issue and treated with formal physical therapy with appreciable relief of his symptoms  He is also noting a focal, superficial "burning" type pain about the posterior aspect of the left shoulder for the past 3-4 days, but states that this is not nearly as bad as his hips     Denies numbness and tingling, fevers or chills      Physical Exam     /77   Pulse 71   Ht 6' (1 829 m)   Wt 86 2 kg (190 lb)   BMI 25 77 kg/m²     Right hip:  Deformity: None  Tenderness to palpation: Greater Trochanter  Range of motion:  Flexion: Normal  ER: Normal  IR: Normal  Strength:  Flexion: 5/5  Abduction: 5/5  FADDIR test: Negative  REBEL test: Negative  Passive supine rotation test: Negative  Straight leg raise against resistance: Negative    Left hip:  Deformity: None  Tenderness to palpation: Greater Trochanter  Range of motion:  Flexion: Normal  ER: Normal  IR: Normal  Strength:  Flexion: 5/5  Abduction: 5/5  FADDIR test: Negative  REBEL test: Negative  Passive supine rotation test: Negative  Straight leg raise against resistance: Negative    Eyes: Anicteric sclerae  Neck: Supple  Lungs: Unlabored breathing  Cardiovascular: No lower extremity edema bilaterally  Skin: Intact without erythema  Neurologic: Sensation intact to light touch  Psychiatric: Mood and affect are appropriate  Data Review     I have personally reviewed pertinent films in PACS, and my interpretation follows:    X-ray bilateral hips 5/3/18:  No acute osseous abnormality or degenerative changes        Large joint arthrocentesis: bilateral greater trochanteric bursa  Date/Time: 9/17/2019 3:58 PM  Consent given by: patient  Site marked: site marked  Timeout: Immediately prior to procedure a time out was called to verify the correct patient, procedure, equipment, support staff and site/side marked as required   Supporting Documentation  Indications: pain and diagnostic evaluation   Procedure Details  Location: hip - bilateral greater trochanteric bursa  Preparation: Patient was prepped and draped in the usual sterile fashion  Needle size: 22 G  Ultrasound guidance: no  Approach: lateral    Medications (Right): 4 mL lidocaine 1 %; 1 mL methylPREDNISolone acetate 40 mg/mLMedications (Left): 4 mL lidocaine 1 %; 1 mL methylPREDNISolone acetate 40 mg/mL   Patient tolerance: patient tolerated the procedure well with no immediate complications  Dressing:  Sterile dressing applied          Past Medical History:   Diagnosis Date    Hypertension     Migraines        Past Surgical History:   Procedure Laterality Date    COLONOSCOPY      [place] REPAIR ING HERNIA,5+Y/O,REDUCIBL Right 3/22/2018    Procedure: INGUINAL HERNIA REPAIR;  Surgeon: Donald Corrales MD;  [facility];  Service: General       Allergies   Allergen Reactions    Iv Dye [Iodinated Diagnostic Agents] Hives       Current Outpatient Medications on File Prior to Visit   Medication Sig Dispense Refill    verapamil (VERELAN PM) 100 MG 24 hr capsule TAKE 1 CAPSULE DAILY AT BEDTIME 90 capsule 1    [DISCONTINUED] ciprofloxacin (CILOXAN) 0 3 % ophthalmic solution Apply 2 drops into the conjunctival sac q2 hours while awake on day 1-2  1-2 drops q4hr while away day 3-7 (Patient not taking: Reported on 7/22/2019) 5 mL 0    [DISCONTINUED] fluticasone (FLONASE) 50 mcg/act nasal spray 1 spray into each nostril daily (Patient not taking: Reported on 7/22/2019) 16 g 0     No current facility-administered medications on file prior to visit  Social History     Tobacco Use    Smoking status: Never Smoker    Smokeless tobacco: Never Used   Substance Use Topics    Alcohol use: No    Drug use: No       Family History   Problem Relation Age of Onset    Hypertension Mother     Coronary artery disease Father     Hypertension Father        Review of Systems     As stated in the HPI  All other systems were reviewed and are negative        Scribe Attestation    I,:   Danny Herrera am acting as a scribe while in the presence of the attending physician :        I,:   Keysha Pritchard MD personally performed the services described in this documentation    as scribed in my presence :

## 2019-09-17 NOTE — RESULT ENCOUNTER NOTE
I noted pathology favors basal cell carcinoma  Given that its and ear lesion moh would be treatment of choice    I note we will make referral to Dr Kavitha Herbert

## 2019-09-19 RX ORDER — METHYLPREDNISOLONE 4 MG/1
TABLET ORAL
Qty: 21 EACH | Refills: 0 | Status: SHIPPED | OUTPATIENT
Start: 2019-09-19 | End: 2021-05-04

## 2019-09-19 NOTE — ADDENDUM NOTE
Addended by: April Solis on: 9/19/2019 03:41 PM     Modules accepted: Orders
patient/wife, daughter/family/significant other

## 2019-10-01 ENCOUNTER — TELEPHONE (OUTPATIENT)
Dept: DERMATOLOGY | Facility: CLINIC | Age: 29
End: 2019-10-01

## 2019-10-01 NOTE — TELEPHONE ENCOUNTER
Fax received from Dr Martinez office advising they been trying to reach the patient to schedule his consult for MOHS  Called patient left message that Dr Martinez office was contacting him and needs to return there call  Patient to call our office if he can any concerns

## 2019-10-07 ENCOUNTER — PATIENT MESSAGE (OUTPATIENT)
Dept: NEUROLOGY | Facility: CLINIC | Age: 29
End: 2019-10-07

## 2019-10-07 DIAGNOSIS — G44.099 TRIGEMINAL AUTONOMIC CEPHALGIAS: Primary | ICD-10-CM

## 2019-10-08 ENCOUNTER — TELEPHONE (OUTPATIENT)
Dept: NEUROLOGY | Facility: CLINIC | Age: 29
End: 2019-10-08

## 2019-10-08 NOTE — TELEPHONE ENCOUNTER
----- Message from Hernan Srivastava, [address] [place] sent at 10/8/2019  8:18 AM EDT -----  Regarding: FW: Prescription Question  Contact: [phone]      ----- Message -----  From: Paras Poag  Sent: 10/7/2019  11:16 AM EDT  To: Neurology [facility]  Subject: Prescription Question                            Rome Rowan,    My insurance has changed and the cost of the Verapamil Im on has gone up quite a bit ($86/month to $137/month)  Is there any way youd be willing to switch to something a little cheaper  Speaking with the pharmacist, Diltiazem is relatively cheap if you wanted to keep me on a CCB or beta blockers are generally affordable  Thanks for considering!

## 2019-10-10 RX ORDER — VERAPAMIL HYDROCHLORIDE 40 MG/1
TABLET ORAL
Qty: 49 TABLET | Refills: 0 | Status: SHIPPED | OUTPATIENT
Start: 2019-10-10 | End: 2021-05-04

## 2019-10-22 ENCOUNTER — TELEPHONE (OUTPATIENT)
Dept: DERMATOLOGY | Facility: CLINIC | Age: 29
End: 2019-10-22

## 2019-10-23 ENCOUNTER — TELEPHONE (OUTPATIENT)
Dept: DERMATOLOGY | Facility: CLINIC | Age: 29
End: 2019-10-23

## 2019-10-23 NOTE — TELEPHONE ENCOUNTER
Called patient regarding setting up Mohs surgery  Mailbox was full  This is our third attempt calling patient  Letter was mailed

## 2019-10-29 ENCOUNTER — TELEPHONE (OUTPATIENT)
Dept: DERMATOLOGY | Facility: CLINIC | Age: 29
End: 2019-10-29

## 2019-10-29 NOTE — TELEPHONE ENCOUNTER
Pt called in and left voicemail  Left message stating he received our letter and that his phone was broken  He did apologize for not returning calls and says he received care elsewhere

## 2019-12-10 PROCEDURE — 88305 TISSUE EXAM BY PATHOLOGIST: CPT | Performed by: PATHOLOGY

## 2020-05-29 ENCOUNTER — TELEPHONE (OUTPATIENT)
Dept: NEUROLOGY | Facility: CLINIC | Age: 30
End: 2020-05-29

## 2020-06-23 NOTE — ED NOTES
Patient 20/20 in both eyes with contacts     300   11/18/17 4767 [de-identified] : I have recommended that the pt continue with a conservative treatment plan. Pt has been referred to physical therapy for decreased pain modalities, core strengthening modalities, soft tissue modalities, and physical modalities. Continue Voltaren gel. We also spoke about the benefits of using heat, ice, and Bengay cream. Follow up with PCP for possible lipoma. The pt may follow up here on a PRN basis.

## 2021-04-21 ENCOUNTER — TELEPHONE (OUTPATIENT)
Dept: GASTROENTEROLOGY | Facility: CLINIC | Age: 31
End: 2021-04-21

## 2021-04-21 ENCOUNTER — LAB (OUTPATIENT)
Dept: LAB | Facility: HOSPITAL | Age: 31
End: 2021-04-21
Attending: INTERNAL MEDICINE
Payer: COMMERCIAL

## 2021-04-21 DIAGNOSIS — R19.5 ABNORMAL STOOLS: ICD-10-CM

## 2021-04-21 DIAGNOSIS — R19.7 DIARRHEA, UNSPECIFIED TYPE: ICD-10-CM

## 2021-04-21 DIAGNOSIS — R10.11 RIGHT UPPER QUADRANT ABDOMINAL PAIN: ICD-10-CM

## 2021-04-21 DIAGNOSIS — R10.11 RIGHT UPPER QUADRANT ABDOMINAL PAIN: Primary | ICD-10-CM

## 2021-04-21 LAB
ALBUMIN SERPL BCP-MCNC: 4.3 G/DL (ref 3.5–5)
ALP SERPL-CCNC: 79 U/L (ref 46–116)
ALT SERPL W P-5'-P-CCNC: 27 U/L (ref 12–78)
ANION GAP SERPL CALCULATED.3IONS-SCNC: 6 MMOL/L (ref 4–13)
AST SERPL W P-5'-P-CCNC: 14 U/L (ref 5–45)
BILIRUB SERPL-MCNC: 0.31 MG/DL (ref 0.2–1)
BUN SERPL-MCNC: 23 MG/DL (ref 5–25)
CALCIUM SERPL-MCNC: 9.2 MG/DL (ref 8.3–10.1)
CHLORIDE SERPL-SCNC: 104 MMOL/L (ref 100–108)
CO2 SERPL-SCNC: 31 MMOL/L (ref 21–32)
CREAT SERPL-MCNC: 1.1 MG/DL (ref 0.6–1.3)
GFR SERPL CREATININE-BSD FRML MDRD: 89 ML/MIN/1.73SQ M
GLUCOSE SERPL-MCNC: 81 MG/DL (ref 65–140)
POTASSIUM SERPL-SCNC: 3.8 MMOL/L (ref 3.5–5.3)
PROT SERPL-MCNC: 7.7 G/DL (ref 6.4–8.2)
SODIUM SERPL-SCNC: 141 MMOL/L (ref 136–145)

## 2021-04-21 PROCEDURE — 86677 HELICOBACTER PYLORI ANTIBODY: CPT

## 2021-04-21 PROCEDURE — 36415 COLL VENOUS BLD VENIPUNCTURE: CPT

## 2021-04-21 PROCEDURE — 83516 IMMUNOASSAY NONANTIBODY: CPT

## 2021-04-21 PROCEDURE — 86255 FLUORESCENT ANTIBODY SCREEN: CPT

## 2021-04-21 PROCEDURE — 80053 COMPREHEN METABOLIC PANEL: CPT

## 2021-04-21 PROCEDURE — 82784 ASSAY IGA/IGD/IGG/IGM EACH: CPT

## 2021-04-21 NOTE — TELEPHONE ENCOUNTER
Left message for patient to call the office and schedule a virtual visit with Dr Pastora Pineda   Patient was given back line number to call the office directly

## 2021-04-21 NOTE — TELEPHONE ENCOUNTER
Hi,    This patient works for [employer]  Can you please set him for a visit with me on May 3rd at the end of my day, perhaps around 4:20? In the meantime, I ordered blood work and an ultrasound for him to complete       Thank you

## 2021-04-22 LAB
ENDOMYSIUM IGA SER QL: NEGATIVE
GLIADIN PEPTIDE IGA SER-ACNC: 5 UNITS (ref 0–19)
GLIADIN PEPTIDE IGG SER-ACNC: 3 UNITS (ref 0–19)
H PYLORI IGG SER IA-ACNC: 0.46 INDEX VALUE (ref 0–0.79)
IGA SERPL-MCNC: 245 MG/DL (ref 90–386)
TTG IGA SER-ACNC: <2 U/ML (ref 0–3)
TTG IGG SER-ACNC: 4 U/ML (ref 0–5)

## 2021-05-04 ENCOUNTER — TELEMEDICINE (OUTPATIENT)
Dept: GASTROENTEROLOGY | Facility: MEDICAL CENTER | Age: 31
End: 2021-05-04
Payer: COMMERCIAL

## 2021-05-04 DIAGNOSIS — E73.9 LACTOSE INTOLERANCE: ICD-10-CM

## 2021-05-04 DIAGNOSIS — R10.9 ABDOMINAL PAIN, UNSPECIFIED ABDOMINAL LOCATION: Primary | ICD-10-CM

## 2021-05-04 DIAGNOSIS — R19.5 LOOSE STOOLS: ICD-10-CM

## 2021-05-04 PROCEDURE — 99204 OFFICE O/P NEW MOD 45 MIN: CPT | Performed by: INTERNAL MEDICINE

## 2021-05-04 RX ORDER — DICYCLOMINE HCL 20 MG
20 TABLET ORAL EVERY 6 HOURS PRN
Qty: 120 TABLET | Refills: 2 | Status: SHIPPED | OUTPATIENT
Start: 2021-05-04

## 2021-05-04 NOTE — PROGRESS NOTES
Virtual Regular Visit      Assessment/Plan:  The patient is a 32 yo man with lactose intolerance who p/w intermittent abdominal pain and loose stools  He maintains a fairly strict lactose free diet and no clear association with diet and symptoms  Recent CMP, CBC, H pylori IgG and celiac blood work were all normal  DDx includes gallstones / biliary pathology vs PUD vs EPI vs inflammation  Problem List Items Addressed This Visit     None      Visit Diagnoses     Abdominal pain, unspecified abdominal location    -  Primary    Relevant Medications    dicyclomine (BENTYL) 20 mg tablet    Loose stools        Lactose intolerance            Follow-up pending US  Depending on the results, may suggest EGD, calprotectin, elastase, fecal fat  Bentyl PRN as needed  Prescription sent to the pharmacy  Dairy free diet         Reason for visit is   Chief Complaint   Patient presents with    Virtual Regular Visit        Encounter provider Kasia Adams MD    Provider located at [address] [address]      Recent Visits  Date Type Provider Dept   05/04/21 Telemedicine Kasia Adams MD  [facility] [place]   Showing recent visits within past 7 days and meeting all other requirements     Future Appointments  No visits were found meeting these conditions  Showing future appointments within next 150 days and meeting all other requirements        The patient was identified by name and date of birth  Aracely Mcgrath was informed that this is a telemedicine visit and that the visit is being conducted through [facility] and patient was informed that this is a secure, HIPAA-compliant platform  He agrees to proceed     My office door was closed  No one else was in the room  He acknowledged consent and understanding of privacy and security of the video platform   The patient has agreed to participate and understands they can discontinue the visit at any time  Patient is aware this is a billable service  Subjective  Aracely Mcgrath is a 31 y o  male presenting with pain and loose stools  He was fine but ay 3-5 days ago he had diarrhea and abdominal discomfort  It lasted a few days and now subsiding  Moderate and low fat diet  Healthy  He has 2 BMs per day and loose  Usually just one stool  Pain is always RUQ  He had some nausea recently but he woke up dry heaving and he started on Prilosec  No heartburn  No NSAIDs  Weight has been stable  Past Medical History:   Diagnosis Date    Hypertension     Migraines        Past Surgical History:   Procedure Laterality Date    COLONOSCOPY      [place] REPAIR ING HERNIA,5+Y/O,REDUCIBL Right 3/22/2018    Procedure: INGUINAL HERNIA REPAIR;  Surgeon: Elio Perry MD;  [facility];  Service: General       Current Outpatient Medications   Medication Sig Dispense Refill    dicyclomine (BENTYL) 20 mg tablet Take 1 tablet (20 mg total) by mouth every 6 (six) hours as needed (urgency, abdominal pain) 120 tablet 2     No current facility-administered medications for this visit  Allergies   Allergen Reactions    Iv Dye [Iodinated Diagnostic Agents] Hives       REVIEW OF SYSTEMS:  10 point ROS reviewed and negative, except as above      PHYSICAL EXAMINATION:  Appearance and vitals taken from home devices    General Appearance:   Alert, cooperative, no distress   HEENT:  Normocephalic, atraumatic, anicteric  Neck supple, symmetrical, trachea midline  Lungs:   Equal chest rise and unlabored breathing, normal effort, no coughing  Cardiovascular:   No visualized JVD  Abdomen:   No abdominal distension  Skin:   No jaundice, rashes, or lesions  Musculoskeletal:   Normal range of motion visualized  Psych:  Normal affect and normal insight  Neuro:  Alert and appropriate           I spent 10 minutes directly with the patient during this visit      VIRTUAL VISIT DISCLAIMER    Aracely Mcgrath acknowledges that he has consented to an online visit or consultation  He understands that the online visit is based solely on information provided by him, and that, in the absence of a face-to-face physical evaluation by the physician, the diagnosis he receives is both limited and provisional in terms of accuracy and completeness  This is not intended to replace a full medical face-to-face evaluation by the physician  Aracely Mcgrath understands and accepts these terms

## 2021-05-06 ENCOUNTER — HOSPITAL ENCOUNTER (OUTPATIENT)
Dept: ULTRASOUND IMAGING | Facility: HOSPITAL | Age: 31
Discharge: HOME | End: 2021-05-06
Attending: INTERNAL MEDICINE
Payer: COMMERCIAL

## 2021-05-06 DIAGNOSIS — R10.11 RIGHT UPPER QUADRANT ABDOMINAL PAIN: ICD-10-CM

## 2021-05-06 PROCEDURE — 76705 ECHO EXAM OF ABDOMEN: CPT

## 2021-10-11 ENCOUNTER — HOSPITAL ENCOUNTER (EMERGENCY)
Facility: HOSPITAL | Age: 31
Discharge: HOME | End: 2021-10-11
Attending: EMERGENCY MEDICINE
Payer: COMMERCIAL

## 2021-10-11 VITALS
HEART RATE: 79 BPM | BODY MASS INDEX: 26.41 KG/M2 | TEMPERATURE: 99.5 F | HEIGHT: 72 IN | RESPIRATION RATE: 16 BRPM | SYSTOLIC BLOOD PRESSURE: 152 MMHG | OXYGEN SATURATION: 98 % | WEIGHT: 195 LBS | DIASTOLIC BLOOD PRESSURE: 84 MMHG

## 2021-10-11 DIAGNOSIS — Z20.822 ENCOUNTER FOR LABORATORY TESTING FOR COVID-19 VIRUS: Primary | ICD-10-CM

## 2021-10-11 DIAGNOSIS — R50.9 FEVER: ICD-10-CM

## 2021-10-11 LAB
FLUAV RNA RESP QL NAA+PROBE: NEGATIVE
FLUBV RNA RESP QL NAA+PROBE: NEGATIVE
RSV RNA RESP QL NAA+PROBE: NEGATIVE
SARS-COV-2 RNA RESP QL NAA+PROBE: POSITIVE

## 2021-10-11 PROCEDURE — 0241U HB NFCT DS VIR RESP RNA 4 TRGT: CPT | Performed by: PHYSICIAN ASSISTANT

## 2021-10-11 PROCEDURE — 99282 EMERGENCY DEPT VISIT SF MDM: CPT

## 2021-10-11 PROCEDURE — 99281 EMR DPT VST MAYX REQ PHY/QHP: CPT | Performed by: PHYSICIAN ASSISTANT

## 2021-10-19 ENCOUNTER — OFFICE VISIT (OUTPATIENT)
Dept: FAMILY MEDICINE CLINIC | Facility: CLINIC | Age: 31
End: 2021-10-19
Payer: COMMERCIAL

## 2021-10-19 VITALS
TEMPERATURE: 97.4 F | HEIGHT: 72 IN | DIASTOLIC BLOOD PRESSURE: 60 MMHG | HEART RATE: 74 BPM | OXYGEN SATURATION: 98 % | WEIGHT: 198.8 LBS | SYSTOLIC BLOOD PRESSURE: 148 MMHG | BODY MASS INDEX: 26.93 KG/M2

## 2021-10-19 DIAGNOSIS — Z00.00 ANNUAL PHYSICAL EXAM: Primary | ICD-10-CM

## 2021-10-19 DIAGNOSIS — Z83.3 FAMILY HISTORY OF DIABETES MELLITUS: ICD-10-CM

## 2021-10-19 DIAGNOSIS — U07.1 COVID-19 VIRUS INFECTION: ICD-10-CM

## 2021-10-19 DIAGNOSIS — I10 PRIMARY HYPERTENSION: ICD-10-CM

## 2021-10-19 DIAGNOSIS — Z23 ENCOUNTER FOR IMMUNIZATION: ICD-10-CM

## 2021-10-19 DIAGNOSIS — Z13.6 SCREENING FOR CARDIOVASCULAR CONDITION: ICD-10-CM

## 2021-10-19 PROBLEM — G44.099 TRIGEMINAL AUTONOMIC CEPHALGIAS: Status: ACTIVE | Noted: 2017-12-05

## 2021-10-19 PROBLEM — G93.0 SUBARACHNOID CYST: Status: ACTIVE | Noted: 2017-12-05

## 2021-10-19 PROBLEM — H57.02 UNEQUAL PUPILS: Status: ACTIVE | Noted: 2017-11-15

## 2021-10-19 PROBLEM — H53.9 CHANGE IN VISION: Status: ACTIVE | Noted: 2017-11-15

## 2021-10-19 PROCEDURE — 1036F TOBACCO NON-USER: CPT | Performed by: FAMILY MEDICINE

## 2021-10-19 PROCEDURE — 90471 IMMUNIZATION ADMIN: CPT

## 2021-10-19 PROCEDURE — 90715 TDAP VACCINE 7 YRS/> IM: CPT

## 2021-10-19 PROCEDURE — 99385 PREV VISIT NEW AGE 18-39: CPT | Performed by: FAMILY MEDICINE

## 2021-10-19 PROCEDURE — 3725F SCREEN DEPRESSION PERFORMED: CPT | Performed by: FAMILY MEDICINE

## 2021-10-19 PROCEDURE — 3008F BODY MASS INDEX DOCD: CPT | Performed by: FAMILY MEDICINE

## 2021-10-19 RX ORDER — HYDROCHLOROTHIAZIDE 25 MG/1
12.5 TABLET ORAL DAILY
Qty: 45 TABLET | Refills: 0 | Status: SHIPPED | OUTPATIENT
Start: 2021-10-19 | End: 2021-12-27 | Stop reason: SDUPTHER

## 2021-10-20 ENCOUNTER — APPOINTMENT (OUTPATIENT)
Dept: LAB | Facility: CLINIC | Age: 31
End: 2021-10-20
Payer: COMMERCIAL

## 2021-10-20 DIAGNOSIS — I10 PRIMARY HYPERTENSION: ICD-10-CM

## 2021-10-20 DIAGNOSIS — Z83.3 FAMILY HISTORY OF DIABETES MELLITUS: ICD-10-CM

## 2021-10-20 DIAGNOSIS — Z13.6 SCREENING FOR CARDIOVASCULAR CONDITION: ICD-10-CM

## 2021-10-20 DIAGNOSIS — Z00.00 ANNUAL PHYSICAL EXAM: ICD-10-CM

## 2021-10-20 DIAGNOSIS — Z23 ENCOUNTER FOR IMMUNIZATION: ICD-10-CM

## 2021-10-20 DIAGNOSIS — U07.1 COVID-19 VIRUS INFECTION: ICD-10-CM

## 2021-10-20 LAB
ALBUMIN SERPL BCP-MCNC: 4 G/DL (ref 3.5–5)
ALP SERPL-CCNC: 71 U/L (ref 46–116)
ALT SERPL W P-5'-P-CCNC: 44 U/L (ref 12–78)
ANION GAP SERPL CALCULATED.3IONS-SCNC: 4 MMOL/L (ref 4–13)
AST SERPL W P-5'-P-CCNC: 17 U/L (ref 5–45)
BILIRUB SERPL-MCNC: 0.36 MG/DL (ref 0.2–1)
BUN SERPL-MCNC: 20 MG/DL (ref 5–25)
CALCIUM SERPL-MCNC: 9.9 MG/DL (ref 8.3–10.1)
CHLORIDE SERPL-SCNC: 106 MMOL/L (ref 100–108)
CHOLEST SERPL-MCNC: 233 MG/DL (ref 50–200)
CO2 SERPL-SCNC: 29 MMOL/L (ref 21–32)
CREAT SERPL-MCNC: 1.06 MG/DL (ref 0.6–1.3)
ERYTHROCYTE [DISTWIDTH] IN BLOOD BY AUTOMATED COUNT: 12.1 % (ref 11.6–15.1)
EST. AVERAGE GLUCOSE BLD GHB EST-MCNC: 100 MG/DL
GFR SERPL CREATININE-BSD FRML MDRD: 92 ML/MIN/1.73SQ M
GLUCOSE P FAST SERPL-MCNC: 90 MG/DL (ref 65–99)
HBA1C MFR BLD: 5.1 %
HCT VFR BLD AUTO: 49 % (ref 36.5–49.3)
HDLC SERPL-MCNC: 55 MG/DL
HGB BLD-MCNC: 16.5 G/DL (ref 12–17)
LDLC SERPL CALC-MCNC: 150 MG/DL (ref 0–100)
MCH RBC QN AUTO: 30.6 PG (ref 26.8–34.3)
MCHC RBC AUTO-ENTMCNC: 33.7 G/DL (ref 31.4–37.4)
MCV RBC AUTO: 91 FL (ref 82–98)
NONHDLC SERPL-MCNC: 178 MG/DL
PLATELET # BLD AUTO: 291 THOUSANDS/UL (ref 149–390)
PMV BLD AUTO: 10.1 FL (ref 8.9–12.7)
POTASSIUM SERPL-SCNC: 4.5 MMOL/L (ref 3.5–5.3)
PROT SERPL-MCNC: 7.9 G/DL (ref 6.4–8.2)
RBC # BLD AUTO: 5.4 MILLION/UL (ref 3.88–5.62)
SODIUM SERPL-SCNC: 139 MMOL/L (ref 136–145)
TRIGL SERPL-MCNC: 141 MG/DL
TSH SERPL DL<=0.05 MIU/L-ACNC: 1.56 UIU/ML (ref 0.36–3.74)
WBC # BLD AUTO: 6.39 THOUSAND/UL (ref 4.31–10.16)

## 2021-10-20 PROCEDURE — 80053 COMPREHEN METABOLIC PANEL: CPT

## 2021-10-20 PROCEDURE — 84443 ASSAY THYROID STIM HORMONE: CPT

## 2021-10-20 PROCEDURE — 80061 LIPID PANEL: CPT

## 2021-10-20 PROCEDURE — 36415 COLL VENOUS BLD VENIPUNCTURE: CPT

## 2021-10-20 PROCEDURE — 85027 COMPLETE CBC AUTOMATED: CPT

## 2021-10-20 PROCEDURE — 83036 HEMOGLOBIN GLYCOSYLATED A1C: CPT

## 2021-12-27 DIAGNOSIS — I10 PRIMARY HYPERTENSION: ICD-10-CM

## 2021-12-27 RX ORDER — HYDROCHLOROTHIAZIDE 25 MG/1
12.5 TABLET ORAL DAILY
Qty: 45 TABLET | Refills: 0 | Status: SHIPPED | OUTPATIENT
Start: 2021-12-27 | End: 2022-02-09 | Stop reason: SDUPTHER

## 2022-02-09 DIAGNOSIS — I10 PRIMARY HYPERTENSION: ICD-10-CM

## 2022-02-09 RX ORDER — HYDROCHLOROTHIAZIDE 25 MG/1
25 TABLET ORAL DAILY
Qty: 90 TABLET | Refills: 0 | Status: SHIPPED | OUTPATIENT
Start: 2022-02-09 | End: 2022-05-10

## 2022-02-16 ENCOUNTER — OFFICE VISIT (OUTPATIENT)
Dept: URGENT CARE | Facility: MEDICAL CENTER | Age: 32
End: 2022-02-16
Payer: COMMERCIAL

## 2022-02-16 VITALS
DIASTOLIC BLOOD PRESSURE: 82 MMHG | OXYGEN SATURATION: 98 % | SYSTOLIC BLOOD PRESSURE: 134 MMHG | RESPIRATION RATE: 16 BRPM | TEMPERATURE: 98.9 F | HEART RATE: 84 BPM

## 2022-02-16 DIAGNOSIS — J01.10 ACUTE NON-RECURRENT FRONTAL SINUSITIS: ICD-10-CM

## 2022-02-16 DIAGNOSIS — R05.9 COUGH: Primary | ICD-10-CM

## 2022-02-16 PROCEDURE — 87636 SARSCOV2 & INF A&B AMP PRB: CPT | Performed by: PHYSICIAN ASSISTANT

## 2022-02-16 PROCEDURE — 99212 OFFICE O/P EST SF 10 MIN: CPT | Performed by: PHYSICIAN ASSISTANT

## 2022-02-16 RX ORDER — AMOXICILLIN AND CLAVULANATE POTASSIUM 875; 125 MG/1; MG/1
1 TABLET, FILM COATED ORAL 2 TIMES DAILY
Qty: 14 TABLET | Refills: 0 | Status: SHIPPED | OUTPATIENT
Start: 2022-02-16 | End: 2022-02-23

## 2022-02-16 NOTE — PROGRESS NOTES
[address]        NAME: Kimberlyn Rick is a 27 y o  male  : 1990    MRN: [id number]  DATE: 2022  TIME: 2:17 PM    Assessment and Plan   Cough [R05 9]  1  Cough  Cov/Flu-Collected at [facility]   2  Acute non-recurrent frontal sinusitis  amoxicillin-clavulanate (AUGMENTIN) 875-125 mg per tablet         Patient Instructions       Follow up with PCP in 3-5 days  Proceed to  ER if symptoms worsen  Chief Complaint     Chief Complaint   Patient presents with    Nasal Congestion     started with nasal congestion on  , intermittent for 3 days         History of Present Illness       Patient presents with a 3 day history of nasal congestion purulent nasal drainage intermittent cough body aches and lightheadedness  Took to rapid home test which were negative  No known exposure to COVID and he is vaccinated  Review of Systems   Review of Systems   Constitutional: Positive for chills, fatigue and fever  HENT: Positive for congestion and rhinorrhea  Negative for sore throat  Respiratory: Positive for cough  Negative for shortness of breath  Gastrointestinal: Negative for diarrhea, nausea and vomiting  Musculoskeletal: Positive for myalgias  Skin: Negative for rash  Neurological: Positive for light-headedness  Negative for headaches           Current Medications       Current Outpatient Medications:     amoxicillin-clavulanate (AUGMENTIN) 875-125 mg per tablet, Take 1 tablet by mouth 2 (two) times a day for 7 days, Disp: 14 tablet, Rfl: 0    dicyclomine (BENTYL) 20 mg tablet, Take 1 tablet (20 mg total) by mouth every 6 (six) hours as needed (urgency, abdominal pain), Disp: 120 tablet, Rfl: 2    hydrochlorothiazide (HYDRODIURIL) 25 mg tablet, Take 1 tablet (25 mg total) by mouth daily, Disp: 90 tablet, Rfl: 0    Current Allergies     Allergies as of 2022 - Reviewed 2022   Allergen Reaction Noted    Iv dye [iodinated diagnostic agents] Hives 03/22/2018            The following portions of the patient's history were reviewed and updated as appropriate: allergies, current medications, past family history, past medical history, past social history, past surgical history and problem list      Past Medical History:   Diagnosis Date    Hypertension     Migraines        Past Surgical History:   Procedure Laterality Date    COLONOSCOPY      [place] REPAIR ING HERNIA,5+Y/O,REDUCIBL Right 3/22/2018    Procedure: INGUINAL HERNIA REPAIR;  Surgeon: Andie Alford MD;  [facility];  Service: General    VASECTOMY         Family History   Problem Relation Age of Onset    Hypertension Mother     Coronary artery disease Father     Hypertension Father          Medications have been verified  Objective   /82   Pulse 84   Temp 98 9 °F (37 2 °C)   Resp 16   SpO2 98%   No LMP for male patient  Physical Exam     Physical Exam  Vitals and nursing note reviewed  Constitutional:       Appearance: Normal appearance  HENT:      Head: Normocephalic and atraumatic  Right Ear: Tympanic membrane normal       Left Ear: Tympanic membrane normal       Nose: Congestion and rhinorrhea present  Mouth/Throat:      Mouth: Mucous membranes are moist       Pharynx: Oropharynx is clear  Eyes:      Conjunctiva/sclera: Conjunctivae normal    Cardiovascular:      Rate and Rhythm: Normal rate and regular rhythm  Heart sounds: Normal heart sounds  Pulmonary:      Effort: Pulmonary effort is normal       Breath sounds: Normal breath sounds  Musculoskeletal:      Cervical back: Neck supple  Lymphadenopathy:      Cervical: No cervical adenopathy  Skin:     General: Skin is warm  Neurological:      Mental Status: He is alert

## 2022-02-16 NOTE — PATIENT INSTRUCTIONS
Check MyChart for test results  If SARS-CoV-2 is positive it means you tested positive for Covid-19  Start taking Vitamin D3 2000 IU daily  Contact your family doctor to set up a follow up visit  They will monitor you and decide when you can come off quarantine and/or return to work  Drink plenty of fluids  If you have a pulse-oximeter and your oxygen levels drop below 90% you need to be re-evaluated  Try to lay prone (on your stomach) if possible  [address]    Your healthcare provider and/or public health staff have evaluated you and have determined that you do not need to remain in the hospital at this time  At this time you can be isolated at home where you will be monitored by staff from your local or state health department  You should carefully follow the prevention and isolation steps below until a healthcare provider or local or state health department says that you can return to your normal activities  Stay home except to get medical care    People who are mildly ill with COVID-19 are able to isolate at home during their illness  You should restrict activities outside your home, except for getting medical care  Do not go to work, school, or public areas  Avoid using public transportation, ride-sharing, or taxis  Separate yourself from other people and animals in your home    People: As much as possible, you should stay in a specific room and away from other people in your home  Also, you should use a separate bathroom, if available  Animals: You should restrict contact with pets and other animals while you are sick with COVID-19, just like you would around other people  Although there have not been reports of pets or other animals becoming sick with COVID-19, it is still recommended that people sick with COVID-19 limit contact with animals until more information is known about the virus   When possible, have another member of your household care for your animals while you are sick  If you are sick with COVID-19, avoid contact with your pet, including petting, snuggling, being kissed or licked, and sharing food  If you must care for your pet or be around animals while you are sick, wash your hands before and after you interact with pets and wear a facemask  See COVID-19 and Animals for more information  Call ahead before visiting your doctor    If you have a medical appointment, call the healthcare provider and tell them that you have or may have COVID-19  This will help the healthcare providers office take steps to keep other people from getting infected or exposed  Wear a facemask    You should wear a facemask when you are around other people (e g , sharing a room or vehicle) or pets and before you enter a healthcare providers office  If you are not able to wear a facemask (for example, because it causes trouble breathing), then people who live with you should not stay in the same room with you, or they should wear a facemask if they enter your room  Cover your coughs and sneezes    Cover your mouth and nose with a tissue when you cough or sneeze  Throw used tissues in a lined trash can  Immediately wash your hands with soap and water for at least 20 seconds or, if soap and water are not available, clean your hands with an alcohol-based hand  that contains at least 60% alcohol  Clean your hands often    Wash your hands often with soap and water for at least 20 seconds, especially after blowing your nose, coughing, or sneezing; going to the bathroom; and before eating or preparing food  If soap and water are not readily available, use an alcohol-based hand  with at least 60% alcohol, covering all surfaces of your hands and rubbing them together until they feel dry  Soap and water are the best option if hands are visibly dirty  Avoid touching your eyes, nose, and mouth with unwashed hands      Avoid sharing personal household items    You should not share dishes, drinking glasses, cups, eating utensils, towels, or bedding with other people or pets in your home  After using these items, they should be washed thoroughly with soap and water  Clean all high-touch surfaces everyday    High touch surfaces include counters, tabletops, doorknobs, bathroom fixtures, toilets, phones, keyboards, tablets, and bedside tables  Also, clean any surfaces that may have blood, stool, or body fluids on them  Use a household cleaning spray or wipe, according to the label instructions  Labels contain instructions for safe and effective use of the cleaning product including precautions you should take when applying the product, such as wearing gloves and making sure you have good ventilation during use of the product  Monitor your symptoms    Seek prompt medical attention if your illness is worsening (e g , difficulty breathing)  Before seeking care, call your healthcare provider and tell them that you have, or are being evaluated for, COVID-19  Put on a facemask before you enter the facility  These steps will help the healthcare providers office to keep other people in the office or waiting room from getting infected or exposed  Ask your healthcare provider to call the local or [facility] health department  Persons who are placed under active monitoring or facilitated self-monitoring should follow instructions provided by their local health department or occupational health professionals, as appropriate  If you have a medical emergency and need to call 911, notify the dispatch personnel that you have, or are being evaluated for COVID-19  If possible, put on a facemask before emergency medical services arrive      Discontinuing home isolation    Patients with confirmed COVID-19 should remain under home isolation precautions until the following conditions are met:   - They have had no fever for at least 24 hours (that is one full day of no fever without the use medicine that reduces fevers)  AND  - other symptoms have improved (for example, when their cough or shortness of breath have improved)  AND  - If had mild or moderate illness, at least 10 days have passed since their symptoms first appeared or if severe illness (needed oxygen) or immunosuppressed, at least 20 days have passed since symptoms first appeared  Patients with confirmed COVID-19 should also notify close contacts (including their workplace) and ask that they self-quarantine  Currently, close contact is defined as being within 6 feet for 15 minutes or more from the period 24 hours starting 48 hours before symptom onset to the time at which the patient went into isolation  Close contacts of patients diagnosed with COVID-19 should be instructed by the patient to self-quarantine for 14 days from the last time of their last contact with the patient       Source: [employer]

## 2022-02-17 LAB
FLUAV RNA RESP QL NAA+PROBE: POSITIVE
FLUBV RNA RESP QL NAA+PROBE: NEGATIVE
SARS-COV-2 RNA RESP QL NAA+PROBE: NEGATIVE

## 2022-09-22 ENCOUNTER — TELEPHONE (OUTPATIENT)
Dept: NEUROLOGY | Facility: CLINIC | Age: 32
End: 2022-09-22

## 2022-09-22 NOTE — TELEPHONE ENCOUNTER
Fax received from parameds requesting medical records  Scanned into media and sent to [facility]

## 2023-10-16 ENCOUNTER — HOSPITAL ENCOUNTER (EMERGENCY)
Facility: HOSPITAL | Age: 33
Discharge: HOME | End: 2023-10-17
Attending: EMERGENCY MEDICINE | Admitting: EMERGENCY MEDICINE
Payer: COMMERCIAL

## 2023-10-16 VITALS
OXYGEN SATURATION: 99 % | HEART RATE: 64 BPM | BODY MASS INDEX: 26.76 KG/M2 | SYSTOLIC BLOOD PRESSURE: 159 MMHG | WEIGHT: 197.31 LBS | TEMPERATURE: 98.1 F | RESPIRATION RATE: 16 BRPM | DIASTOLIC BLOOD PRESSURE: 89 MMHG

## 2023-10-16 DIAGNOSIS — R10.31 RIGHT LOWER QUADRANT ABDOMINAL PAIN: Primary | ICD-10-CM

## 2023-10-16 LAB
ALBUMIN SERPL BCP-MCNC: 4.6 G/DL (ref 3.5–5)
ALP SERPL-CCNC: 55 U/L (ref 34–104)
ALT SERPL W P-5'-P-CCNC: 11 U/L (ref 7–52)
ANION GAP SERPL CALCULATED.3IONS-SCNC: 6 MMOL/L
APTT PPP: 26 SECONDS (ref 23–37)
AST SERPL W P-5'-P-CCNC: 12 U/L (ref 13–39)
BASOPHILS # BLD AUTO: 0.03 THOUSANDS/ÂΜL (ref 0–0.1)
BASOPHILS NFR BLD AUTO: 1 % (ref 0–1)
BILIRUB SERPL-MCNC: 0.33 MG/DL (ref 0.2–1)
BUN SERPL-MCNC: 18 MG/DL (ref 5–25)
CALCIUM SERPL-MCNC: 9.4 MG/DL (ref 8.4–10.2)
CHLORIDE SERPL-SCNC: 105 MMOL/L (ref 96–108)
CO2 SERPL-SCNC: 28 MMOL/L (ref 21–32)
CREAT SERPL-MCNC: 0.97 MG/DL (ref 0.6–1.3)
CRP SERPL QL: 1.9 MG/L
EOSINOPHIL # BLD AUTO: 0.11 THOUSAND/ÂΜL (ref 0–0.61)
EOSINOPHIL NFR BLD AUTO: 2 % (ref 0–6)
ERYTHROCYTE [DISTWIDTH] IN BLOOD BY AUTOMATED COUNT: 11.9 % (ref 11.6–15.1)
GFR SERPL CREATININE-BSD FRML MDRD: 101 ML/MIN/1.73SQ M
GLUCOSE SERPL-MCNC: 104 MG/DL (ref 65–140)
HCT VFR BLD AUTO: 42.8 % (ref 36.5–49.3)
HGB BLD-MCNC: 15.2 G/DL (ref 12–17)
IMM GRANULOCYTES # BLD AUTO: 0.02 THOUSAND/UL (ref 0–0.2)
IMM GRANULOCYTES NFR BLD AUTO: 0 % (ref 0–2)
INR PPP: 0.92 (ref 0.84–1.19)
LACTATE SERPL-SCNC: 1.2 MMOL/L (ref 0.5–2)
LIPASE SERPL-CCNC: 56 U/L (ref 11–82)
LYMPHOCYTES # BLD AUTO: 3.38 THOUSANDS/ÂΜL (ref 0.6–4.47)
LYMPHOCYTES NFR BLD AUTO: 57 % (ref 14–44)
MCH RBC QN AUTO: 30.9 PG (ref 26.8–34.3)
MCHC RBC AUTO-ENTMCNC: 35.5 G/DL (ref 31.4–37.4)
MCV RBC AUTO: 87 FL (ref 82–98)
MONOCYTES # BLD AUTO: 0.38 THOUSAND/ÂΜL (ref 0.17–1.22)
MONOCYTES NFR BLD AUTO: 6 % (ref 4–12)
NEUTROPHILS # BLD AUTO: 2.04 THOUSANDS/ÂΜL (ref 1.85–7.62)
NEUTS SEG NFR BLD AUTO: 34 % (ref 43–75)
NRBC BLD AUTO-RTO: 0 /100 WBCS
PLATELET # BLD AUTO: 246 THOUSANDS/UL (ref 149–390)
PMV BLD AUTO: 9.3 FL (ref 8.9–12.7)
POTASSIUM SERPL-SCNC: 3.9 MMOL/L (ref 3.5–5.3)
PROT SERPL-MCNC: 6.9 G/DL (ref 6.4–8.4)
PROTHROMBIN TIME: 12.7 SECONDS (ref 11.6–14.5)
RBC # BLD AUTO: 4.92 MILLION/UL (ref 3.88–5.62)
SODIUM SERPL-SCNC: 139 MMOL/L (ref 135–147)
WBC # BLD AUTO: 5.96 THOUSAND/UL (ref 4.31–10.16)

## 2023-10-16 PROCEDURE — 85025 COMPLETE CBC W/AUTO DIFF WBC: CPT | Performed by: EMERGENCY MEDICINE

## 2023-10-16 PROCEDURE — 80053 COMPREHEN METABOLIC PANEL: CPT | Performed by: EMERGENCY MEDICINE

## 2023-10-16 PROCEDURE — 85610 PROTHROMBIN TIME: CPT | Performed by: EMERGENCY MEDICINE

## 2023-10-16 PROCEDURE — 83605 ASSAY OF LACTIC ACID: CPT | Performed by: EMERGENCY MEDICINE

## 2023-10-16 PROCEDURE — 36415 COLL VENOUS BLD VENIPUNCTURE: CPT | Performed by: EMERGENCY MEDICINE

## 2023-10-16 PROCEDURE — 85730 THROMBOPLASTIN TIME PARTIAL: CPT | Performed by: EMERGENCY MEDICINE

## 2023-10-16 PROCEDURE — 86140 C-REACTIVE PROTEIN: CPT | Performed by: EMERGENCY MEDICINE

## 2023-10-16 PROCEDURE — 83690 ASSAY OF LIPASE: CPT | Performed by: EMERGENCY MEDICINE

## 2023-10-16 RX ORDER — DIPHENHYDRAMINE HYDROCHLORIDE 50 MG/ML
50 INJECTION INTRAMUSCULAR; INTRAVENOUS ONCE
Status: COMPLETED | OUTPATIENT
Start: 2023-10-16 | End: 2023-10-16

## 2023-10-16 RX ADMIN — SODIUM CHLORIDE 1000 ML: 0.9 INJECTION, SOLUTION INTRAVENOUS at 23:34

## 2023-10-16 RX ADMIN — HYDROCORTISONE SODIUM SUCCINATE 150 MG: 100 INJECTION, POWDER, FOR SOLUTION INTRAMUSCULAR; INTRAVENOUS at 23:35

## 2023-10-16 RX ADMIN — DIPHENHYDRAMINE HYDROCHLORIDE 50 MG: 50 INJECTION, SOLUTION INTRAMUSCULAR; INTRAVENOUS at 23:35

## 2023-10-17 ENCOUNTER — APPOINTMENT (EMERGENCY)
Dept: CT IMAGING | Facility: HOSPITAL | Age: 33
End: 2023-10-17
Payer: COMMERCIAL

## 2023-10-17 PROCEDURE — 74177 CT ABD & PELVIS W/CONTRAST: CPT

## 2023-10-17 RX ADMIN — IOHEXOL 100 ML: 350 INJECTION, SOLUTION INTRAVENOUS at 00:31

## 2023-10-17 NOTE — ED PROVIDER NOTES
History  Chief Complaint   Patient presents with    Abdominal Pain     Patient reports intermittent RLQ pain x a few days that became more constant today. Today also started w/ intermittent nausea. Hx of inguinal hernia repair. Patient is a 50-year-old male with a history of inguinal hernia repair on the right prior vasectomy and colonoscopy presents to the emergency department complaining of right lower quadrant abdominal pain started 2 days ago more constant and worsening throughout the day today has had some mild nausea no vomiting no diarrhea no constipation no fevers or chills. History provided by:  Patient  Abdominal Pain  Pain location:  RLQ  Pain quality: aching and cramping    Pain radiates to:  Does not radiate  Associated symptoms: nausea    Associated symptoms: no chest pain, no chills, no constipation, no cough, no diarrhea, no dysuria, no fatigue, no fever, no hematuria, no shortness of breath, no sore throat and no vomiting        Prior to Admission Medications   Prescriptions Last Dose Informant Patient Reported?  Taking?   dicyclomine (BENTYL) 20 mg tablet   No No   Sig: Take 1 tablet (20 mg total) by mouth every 6 (six) hours as needed (urgency, abdominal pain)   Patient not taking: Reported on 4/21/2023   hydrochlorothiazide (HYDRODIURIL) 25 mg tablet   No No   Sig: Take 1 tablet (25 mg total) by mouth daily      Facility-Administered Medications: None       Past Medical History:   Diagnosis Date    Hypertension     Migraines        Past Surgical History:   Procedure Laterality Date    COLONOSCOPY      DE RPR 1ST INGUN HRNA AGE 4 YRS/> REDUCIBLE Right 3/22/2018    Procedure: INGUINAL HERNIA REPAIR;  Surgeon: Forest Leigh MD;  Location: [facility];  Service: General    VASECTOMY         Family History   Problem Relation Age of Onset    Hypertension Mother     Coronary artery disease Father     Hypertension Father      I have reviewed and agree with the history as documented. E-Cigarette/Vaping    E-Cigarette Use Never User      E-Cigarette/Vaping Substances    Nicotine No     THC No     CBD No     Flavoring No     Other No     Unknown No      Social History     Tobacco Use    Smoking status: Never    Smokeless tobacco: Never   Vaping Use    Vaping Use: Never used   Substance Use Topics    Alcohol use: No    Drug use: No       Review of Systems   Constitutional:  Negative for activity change, appetite change, chills, fatigue and fever. HENT:  Negative for congestion, ear pain, rhinorrhea and sore throat. Eyes:  Negative for discharge, redness and visual disturbance. Respiratory:  Negative for cough, chest tightness, shortness of breath and wheezing. Cardiovascular:  Negative for chest pain and palpitations. Gastrointestinal:  Positive for abdominal pain and nausea. Negative for constipation, diarrhea and vomiting. Endocrine: Negative for polydipsia and polyuria. Genitourinary:  Negative for difficulty urinating, dysuria, frequency, hematuria and urgency. Musculoskeletal:  Negative for arthralgias and myalgias. Skin:  Negative for color change, pallor and rash. Neurological:  Negative for dizziness, weakness, light-headedness, numbness and headaches. Hematological:  Negative for adenopathy. Does not bruise/bleed easily. All other systems reviewed and are negative. Physical Exam  Physical Exam  Vitals and nursing note reviewed. Constitutional:       Appearance: He is well-developed. HENT:      Head: Normocephalic and atraumatic. Right Ear: External ear normal.      Left Ear: External ear normal.      Nose: Nose normal.   Eyes:      Conjunctiva/sclera: Conjunctivae normal.      Pupils: Pupils are equal, round, and reactive to light. Cardiovascular:      Rate and Rhythm: Normal rate and regular rhythm. Heart sounds: Normal heart sounds. Pulmonary:      Effort: Pulmonary effort is normal. No respiratory distress.       Breath sounds: Normal breath sounds. No wheezing or rales. Chest:      Chest wall: No tenderness. Abdominal:      General: Bowel sounds are normal. There is no distension. Palpations: Abdomen is soft. Tenderness: There is abdominal tenderness in the right lower quadrant. There is no guarding or rebound. Musculoskeletal:         General: Normal range of motion. Cervical back: Normal range of motion and neck supple. Skin:     General: Skin is warm and dry. Neurological:      Mental Status: He is alert and oriented to person, place, and time. Cranial Nerves: No cranial nerve deficit. Sensory: No sensory deficit. Vital Signs  ED Triage Vitals [10/16/23 2309]   Temperature Pulse Respirations Blood Pressure SpO2   98.1 °F (36.7 °C) 64 16 159/89 99 %      Temp Source Heart Rate Source Patient Position - Orthostatic VS BP Location FiO2 (%)   Temporal Monitor Lying Left arm --      Pain Score       4           Vitals:    10/16/23 2309   BP: 159/89   Pulse: 64   Patient Position - Orthostatic VS: Lying         Visual Acuity      ED Medications  Medications   sodium chloride 0.9 % bolus 1,000 mL (0 mL Intravenous Stopped 10/17/23 0034)   hydrocortisone (Solu-CORTEF) injection 150 mg (150 mg Intravenous Given 10/16/23 2335)   diphenhydrAMINE (BENADRYL) injection 50 mg (50 mg Intravenous Given 10/16/23 2335)   iohexol (OMNIPAQUE) 350 MG/ML injection (MULTI-DOSE) 100 mL (100 mL Intravenous Given 10/17/23 0031)       Diagnostic Studies  Results Reviewed       Procedure Component Value Units Date/Time    Lactic acid, plasma (w/reflex if result > 2.0) [[id number]]  (Normal) Collected: 10/16/23 2333    Lab Status: Final result Specimen: Blood from Arm, Right Updated: 10/16/23 2358     LACTIC ACID 1.2 mmol/L     Narrative:      Result may be elevated if tourniquet was used during collection.     Comprehensive metabolic panel [[id number]]  (Abnormal) Collected: 10/16/23 2333    Lab Status: Final result Specimen: Blood from Arm, Right Updated: 10/16/23 2357     Sodium 139 mmol/L      Potassium 3.9 mmol/L      Chloride 105 mmol/L      CO2 28 mmol/L      ANION GAP 6 mmol/L      BUN 18 mg/dL      Creatinine 0.97 mg/dL      Glucose 104 mg/dL      Calcium 9.4 mg/dL      AST 12 U/L      ALT 11 U/L      Alkaline Phosphatase 55 U/L      Total Protein 6.9 g/dL      Albumin 4.6 g/dL      Total Bilirubin 0.33 mg/dL      eGFR 101 ml/min/1.73sq m     Narrative:      [facility] guidelines for Chronic Kidney Disease (CKD):     Stage 1 with normal or high GFR (GFR > 90 mL/min/1.73 square meters)    Stage 2 Mild CKD (GFR = 60-89 mL/min/1.73 square meters)    Stage 3A Moderate CKD (GFR = 45-59 mL/min/1.73 square meters)    Stage 3B Moderate CKD (GFR = 30-44 mL/min/1.73 square meters)    Stage 4 Severe CKD (GFR = 15-29 mL/min/1.73 square meters)    Stage 5 End Stage CKD (GFR <15 mL/min/1.73 square meters)  Note: GFR calculation is accurate only with a steady state creatinine    Lipase [[id number]]  (Normal) Collected: 10/16/23 2333    Lab Status: Final result Specimen: Blood from Arm, Right Updated: 10/16/23 2357     Lipase 56 u/L     C-reactive protein [[id number]]  (Normal) Collected: 10/16/23 2333    Lab Status: Final result Specimen: Blood from Arm, Right Updated: 10/16/23 2357     CRP 1.9 mg/L     Protime-INR [[id number]]  (Normal) Collected: 10/16/23 2333    Lab Status: Final result Specimen: Blood from Arm, Right Updated: 10/16/23 2352     Protime 12.7 seconds      INR 0.92    APTT [[id number]]  (Normal) Collected: 10/16/23 2333    Lab Status: Final result Specimen: Blood from Arm, Right Updated: 10/16/23 2352     PTT 26 seconds     CBC and differential [[id number]]  (Abnormal) Collected: 10/16/23 2333    Lab Status: Final result Specimen: Blood from Arm, Right Updated: 10/16/23 2339     WBC 5.96 Thousand/uL      RBC 4.92 Million/uL      Hemoglobin 15.2 g/dL      Hematocrit 42.8 %      MCV 87 fL      MCH 30.9 pg      MCHC 35.5 g/dL      RDW 11.9 %      MPV 9.3 fL      Platelets 620 Thousands/uL      nRBC 0 /100 WBCs      Neutrophils Relative 34 %      Immat GRANS % 0 %      Lymphocytes Relative 57 %      Monocytes Relative 6 %      Eosinophils Relative 2 %      Basophils Relative 1 %      Neutrophils Absolute 2.04 Thousands/µL      Immature Grans Absolute 0.02 Thousand/uL      Lymphocytes Absolute 3.38 Thousands/µL      Monocytes Absolute 0.38 Thousand/µL      Eosinophils Absolute 0.11 Thousand/µL      Basophils Absolute 0.03 Thousands/µL     UA w Reflex to Microscopic w Reflex to Culture [[id number]]     Lab Status: No result Specimen: Urine                    CT abdomen pelvis with contrast   Final Result by Allen Guo MD (10/17 0110)      Normal appendix. No evidence of bowel obstruction, colitis or diverticulitis. Workstation performed: [id number]                    Procedures  Procedures         ED Course                               SBIRT 20yo+      Flowsheet Row Most Recent Value   Initial Alcohol Screen: US AUDIT-C     1. How often do you have a drink containing alcohol? 0 Filed at: 10/16/2023 2308   2. How many drinks containing alcohol do you have on a typical day you are drinking? 0 Filed at: 10/16/2023 2308   3a. Male UNDER 65: How often do you have five or more drinks on one occasion? 0 Filed at: 10/16/2023 2308   3b. FEMALE Any Age, or MALE 65+: How often do you have 4 or more drinks on one occassion? 0 Filed at: 10/16/2023 2308   Audit-C Score 0 Filed at: 10/16/2023 2308   LUCIE: How many times in the past year have you. .. Used an illegal drug or used a prescription medication for non-medical reasons? Never Filed at: 10/16/2023 2308                      Medical Decision Making  Differential diagnosis included but not limited to diverticulitis appendicitis hernia bowel obstruction.   Patient remained clinically and hemodynamically stable in the emergency department he is afebrile nontoxic well-appearing work-up in the ED reveals no evidence of acute intra-abdominal pathology. For now advise supportive care and prompt follow-up with primary physician for further evaluation and treatment and obtain test results return precautions and anticipatory guidance discussed. Amount and/or Complexity of Data Reviewed  Labs: ordered. Decision-making details documented in ED Course. Radiology: ordered and independent interpretation performed. Decision-making details documented in ED Course. Risk  Prescription drug management. Disposition  Final diagnoses:   Right lower quadrant abdominal pain     Time reflects when diagnosis was documented in both MDM as applicable and the Disposition within this note       Time User Action Codes Description Comment    10/17/2023  1:18 AM Amberly Breezy Add [R10.31] Right lower quadrant abdominal pain           ED Disposition       ED Disposition   Discharge    Condition   Stable    Date/Time   Tue Oct 17, 2023 0118    [address] discharge to home/self care. Follow-up Information       Follow up With Specialties Details Why Basim Finney MD Family Medicine Schedule an appointment as soon as possible for a visit in 3 days  [address]  [phone]              Patient's Medications   Discharge Prescriptions    No medications on file       No discharge procedures on file.     PDMP Review       None            ED Provider  Electronically Signed by             Oralia Dailey DO  10/17/23 0119

## 2023-10-20 ENCOUNTER — VBI (OUTPATIENT)
Dept: ADMINISTRATIVE | Facility: OTHER | Age: 33
End: 2023-10-20

## 2023-10-20 NOTE — TELEPHONE ENCOUNTER
10/18/23 12:29 PM    Patient contacted post ED visit, first outreach attempt made. Message was left for patient to return a call to the VBI Department at [facility]: Phone [phone]. Thank you.   Ольга Aguilar  [facility]

## 2023-10-20 NOTE — TELEPHONE ENCOUNTER
10/19/23 12:29 PM    Patient contacted post ED visit, second outreach attempt made. Message was left for patient to return a call to the VBI Department at [facility]: Phone [phone]. Thank you.   Ольга Aguilar  [facility]

## 2023-10-20 NOTE — TELEPHONE ENCOUNTER
10/20/23 12:30 PM    Patient contacted post ED visit, third outreach attempt made. Message was left for patient to return a call to either the VBI Department at [facility]: Phone [phone] or the PCP office. 10/20/23 12:30 PM    Patient contacted post ED visit, phone outreaches were unsuccessful and a MyChart letter has been sent to the patient as follow-up. Thank you. Ольга Aguilar  [facility]      Thank you.   Ольга Aguilar  [facility]

## 2023-10-20 NOTE — LETTER
[facility]ED [facility]IR  [facility] [id number]    Date: 10/20/23  Roderick Tee  [address]  [address] [address]    Dear Stanley Brown: Thank you for choosing [facility] emergency department for care. Your primary care provider wants to make sure that your ongoing medical care is being addressed. If you require follow up care as a result of your emergency department visit, there are a few things the practice would like you to know. As part of the network's continuing commitment to caring for our patients, we have added more same day appointments and have extended office hours to meet your medical needs. After hours, on-call physicians are available via your primary care provider's main office line. We encourage you to contact our office prior to seeking treatment to discuss your symptoms with the medical staff. Together, we can determine the correct course of action. A majority of non-emergent conditions such as: common cold, flu-like symptoms, fevers, strains/sprains, dislocations, minor burns, cuts and animal bites can be treated at [facility]. Diagnostic testing is available at some sites. Of course, if you are experiencing a life threatening medical emergency call 911 or proceed directly to the nearest emergency room. Your nearest [facility] facility is conveniently located at:    [place] [phone]  SKIP THE WAIT  Conveniently offered at most [facility] locations  [address] [place] [address] Avcody your spot online at [email or web address] or on the [address] [place] [address] Drive    Sincerely,    [facility]  No information on file.

## 2024-01-19 ENCOUNTER — TELEPHONE (OUTPATIENT)
Dept: FAMILY MEDICINE CLINIC | Facility: CLINIC | Age: 34
End: 2024-01-19

## 2024-01-19 NOTE — TELEPHONE ENCOUNTER
I spoke to patients wife and she states that they have establish care with [email or web address]  Dr. Juliet Cobos, DO    [address] [place] [address] [phone]  Open · Closes 5 PM    Please help us by removing Dr Adames as a primary care provider. Thanks

## 2024-01-27 NOTE — TELEPHONE ENCOUNTER
01/26/24 11:38 PM        The office's request has been received, reviewed, and the patient chart updated. The PCP has successfully been removed with a patient attribution note. This message will now be completed.        Thank you  Huong Cho

## 2025-05-28 ENCOUNTER — APPOINTMENT (OUTPATIENT)
Dept: RADIOLOGY | Facility: MEDICAL CENTER | Age: 35
End: 2025-05-28
Attending: STUDENT IN AN ORGANIZED HEALTH CARE EDUCATION/TRAINING PROGRAM
Payer: COMMERCIAL

## 2025-05-28 ENCOUNTER — OFFICE VISIT (OUTPATIENT)
Dept: OBGYN CLINIC | Facility: CLINIC | Age: 35
End: 2025-05-28
Payer: COMMERCIAL

## 2025-05-28 VITALS
HEART RATE: 81 BPM | RESPIRATION RATE: 16 BRPM | WEIGHT: 201.4 LBS | BODY MASS INDEX: 27.28 KG/M2 | HEIGHT: 72 IN | TEMPERATURE: 98 F | OXYGEN SATURATION: 99 %

## 2025-05-28 DIAGNOSIS — M79.641 PAIN OF RIGHT HAND: ICD-10-CM

## 2025-05-28 DIAGNOSIS — M79.642 PAIN OF LEFT HAND: ICD-10-CM

## 2025-05-28 DIAGNOSIS — M67.449 GANGLION CYST OF FINGER: ICD-10-CM

## 2025-05-28 PROCEDURE — 73130 X-RAY EXAM OF HAND: CPT

## 2025-05-28 PROCEDURE — 99203 OFFICE O/P NEW LOW 30 MIN: CPT | Performed by: STUDENT IN AN ORGANIZED HEALTH CARE EDUCATION/TRAINING PROGRAM

## 2025-05-28 NOTE — PROGRESS NOTES
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   Ordered ultrasound of the left long finger for further evaluation.   May consider surgical intervention depending on ultrasound results.   Follow-up as needed.   Orders:    XR hand 3+ vw left; Future    US MSK limited; Future    Pain of right hand  Discussed natural history of A2 pulley strain.   Recommend conservative management with rest, ice, and NSAIDs.   May try pulley ring for activity.   Orders:    XR hand 3+ vw right; Future         1. Pain of left hand  XR hand 3+ vw left      2. Ganglion cyst of finger  XR hand 3+ vw left      3. Pain of right hand  XR hand 3+ vw right            _____________________________________________________  CHIEF COMPLAINT:  Bilateral hands    Referred By:  No referring provider defined for this encounter.    SUBJECTIVE:  Mateusz Garcia is a 34 y.o. right hand dominant male presenting for evaluation of bilateral hands. Here to establish care.   Patient reports he has a bump that he has noticed on the ulnar aspect of the left middle finger that has been present for a few years. Patient states he noticed it when he was wearing a metal wedding band, and it was rubbing against the area. Patient denies any numbness or tingling in the left hand. Patient states the bump has fluctuated in size. Patient denies any redness or drainage from the area.     Patient reports he has episodic pain on the volar aspect of the right ring finger near the A2 pulley. Patient reports he has the pain with lifting and pulling objects. Patient states it occurs every few weeks for the past 5-6 months. Patient denies any locking or catching.     ADLs: Community ambulator    Occupation/hobbies: works for [employer]       PAST MEDICAL HISTORY:  Past Medical History[1]    PAST SURGICAL HISTORY:  Past Surgical History[2]    FAMILY HISTORY:  Family History[3]    SOCIAL  HISTORY:  Social History[4]    MEDICATIONS:  Current Medications[5]    ALLERGIES:  Allergies[6]    REVIEW OF SYSTEMS:  Pertinent items are noted in HPI.  A comprehensive review of systems was negative.    LABS:  HgA1c:   Lab Results   Component Value Date    HGBA1C 5.1 10/20/2021     BMP:   Lab Results   Component Value Date    CALCIUM 9.4 10/16/2023    K 3.9 10/16/2023    CO2 28 10/16/2023     10/16/2023    BUN 18 10/16/2023    CREATININE 0.97 10/16/2023         _____________________________________________________  PHYSICAL EXAMINATION:  Pulse 81   Temp 98 °F (36.7 °C) (Temporal)   Resp 16   Ht 6' (1.829 m)   Wt 91.4 kg (201 lb 6.4 oz)   SpO2 99%   BMI 27.31 kg/m²     General: Well developed and well nourished, alert & oriented x 3, appears comfortable   Psychiatric: Normal   HEENT: Normocephalic, Atraumatic Trachea Midline, No torticollis   Pulmonary: No audible wheezing or respiratory distress   Abdomen/GI: Non tender, non distended   Cardiovascular: No pitting edema, 2+ radial pulse       MUSCULOSKELETAL EXAMINATION:  Right hand    Skin intact  No soft tissue swelling  TTP over A2 pulley.   Neurovascular:  Motor Exam: firing AIN/PIN/U.   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 Approximately 3mm across. Cyst does not translate with movement of the tendon.   Negative tinel over the cyst. Sensation intact throughout  _____________________________________________________  STUDIES REVIEWED:  Radiographs: I personally reviewed and independently interpreted the available radiographs.  5/28/2025: Radiographs of the bilateral hands, multiple views, demonstrate no acute fracture or dislocation.       Codey Edwards MD  Hand and Upper Extremity Surgery        *This note was dictated using Dragon voice recognition software.  Please excuse any word substitutions or errors.*    Scribe Attestation      I,:  Gabbi Shankar PA-C am acting as a scribe while in the presence of the attending physician.:       I,:  Codey Edwards MD personally performed the services described in this documentation    as scribed in my presence.:                    [1]   Past Medical History:  Diagnosis Date    Hypertension     Migraines    [2]   Past Surgical History:  Procedure Laterality Date    COLONOSCOPY      SC RPR 1ST INGUN HRNA AGE 4 YRS/> REDUCIBLE Right 3/22/2018    Procedure: INGUINAL HERNIA REPAIR;  Surgeon: Audi Rojo MD;  Location: [facility];  Service: General    VASECTOMY     [3]   Family History  Problem Relation Name Age of Onset    Hypertension Mother      Coronary artery disease Father      Hypertension Father     [4]   Social History  Tobacco Use    Smoking status: Never    Smokeless tobacco: Never   Vaping Use    Vaping status: Never Used   Substance Use Topics    Alcohol use: No    Drug use: No   [5] No current outpatient medications on file.  [6]   Allergies  Allergen Reactions    Iv Dye [Iodinated Contrast Media] Hives

## (undated) DEVICE — SUT VICRYL 0 CT-1 27 IN J260H

## (undated) DEVICE — LIGHT HANDLE COVER SLEEVE DISP BLUE STELLAR

## (undated) DEVICE — CHLORAPREP HI-LITE 26ML ORANGE

## (undated) DEVICE — SUT VICRYL 1 CT-1 27 IN J261H

## (undated) DEVICE — GLOVE SRG BIOGEL ECLIPSE 7

## (undated) DEVICE — ADHESIVE SKN CLSR HISTOACRYL FLEX 0.5ML LF

## (undated) DEVICE — SUT VICRYL 3-0 SH 27 IN J416H

## (undated) DEVICE — SUT VICRYL 2-0 REEL 54 IN J286G

## (undated) DEVICE — SUT MONOCRYL 4-0 PS-2 18 IN Y496G

## (undated) DEVICE — TOWEL SET X-RAY

## (undated) DEVICE — VIAL DECANTER

## (undated) DEVICE — INTENDED FOR TISSUE SEPARATION, AND OTHER PROCEDURES THAT REQUIRE A SHARP SURGICAL BLADE TO PUNCTURE OR CUT.: Brand: BARD-PARKER SAFETY BLADES SIZE 15, STERILE

## (undated) DEVICE — REM POLYHESIVE ADULT PATIENT RETURN ELECTRODE: Brand: VALLEYLAB

## (undated) DEVICE — NEEDLE 22 G X 1 1/2 SAFETY

## (undated) DEVICE — STRL UNIVERSAL MINOR GENERAL: Brand: CARDINAL HEALTH

## (undated) DEVICE — SUT PDS II 3-0 SH 27 IN Z316H